# Patient Record
Sex: FEMALE | Race: WHITE
[De-identification: names, ages, dates, MRNs, and addresses within clinical notes are randomized per-mention and may not be internally consistent; named-entity substitution may affect disease eponyms.]

---

## 2017-01-28 ENCOUNTER — HOSPITAL ENCOUNTER (OUTPATIENT)
Dept: HOSPITAL 62 - ER | Age: 37
Discharge: HOME | End: 2017-01-28
Attending: OBSTETRICS & GYNECOLOGY
Payer: MEDICAID

## 2017-01-28 VITALS — DIASTOLIC BLOOD PRESSURE: 69 MMHG | SYSTOLIC BLOOD PRESSURE: 109 MMHG

## 2017-01-28 DIAGNOSIS — O09.522: ICD-10-CM

## 2017-01-28 DIAGNOSIS — O99.89: ICD-10-CM

## 2017-01-28 DIAGNOSIS — M54.9: ICD-10-CM

## 2017-01-28 DIAGNOSIS — O47.02: Primary | ICD-10-CM

## 2017-01-28 DIAGNOSIS — Z3A.24: ICD-10-CM

## 2017-01-28 LAB
APPEARANCE UR: (no result)
BARBITURATES UR QL SCN: NEGATIVE
BILIRUB UR QL STRIP: NEGATIVE
GLUCOSE UR STRIP-MCNC: NEGATIVE MG/DL
KETONES UR STRIP-MCNC: (no result) MG/DL
METHADONE UR QL SCN: NEGATIVE
NITRITE UR QL STRIP: POSITIVE
PCP UR QL SCN: NEGATIVE
PH UR STRIP: 5 [PH] (ref 5–9)
PROT UR STRIP-MCNC: 30 MG/DL
SP GR UR STRIP: 1.01
URINE OPIATES LOW: NEGATIVE
UROBILINOGEN UR-MCNC: NEGATIVE MG/DL (ref ?–2)

## 2017-01-28 PROCEDURE — 59899 UNLISTED PX MAT CARE&DLVR: CPT

## 2017-01-28 PROCEDURE — 4A1HXCZ MONITORING OF PRODUCTS OF CONCEPTION, CARDIAC RATE, EXTERNAL APPROACH: ICD-10-PCS | Performed by: OBSTETRICS & GYNECOLOGY

## 2017-01-28 PROCEDURE — 81001 URINALYSIS AUTO W/SCOPE: CPT

## 2017-01-28 PROCEDURE — 80307 DRUG TEST PRSMV CHEM ANLYZR: CPT

## 2017-01-28 NOTE — L&D FLOW SHEET
=================================================================

LD Flowsheet

=================================================================

Datetime Report Generated by CPN: 01/28/2017 14:00

   

   

=================================================================

Datetime: 01/28/2017 13:35

=================================================================

   

   

=================================================================

Vaginal Exam

=================================================================

   

 Dilatation (cm):  0.0 (Milvia Sorensen RN)

 Exam by:  Dr. Warner (Milvia Sorensen RN)

 Vaginal Exam Comments:  No tension noted on cerclage. (Milvia Sorensen, RN)

   

=================================================================

Communication

=================================================================

   

 Communication:  Provider at Bedside (Milvia Sorensen RN)

 Provider Notified (Name):   Neadali (Milvia Sorensen RN)

   

=================================================================

Datetime: 01/28/2017 13:24

=================================================================

   

   

=================================================================

Pain

=================================================================

   

 Pain Scale:  5 (Milvia Sorensen RN)

 Pain Presence:  Intermittent (Milvia Sorensen RN)

 Pain Type:  Cramping (Milvia Sorensen RN)

 Pain Location:  Abdomen; Back (Milvia Sorensen RN)

 Pain Goal:  0 (Milvia Sorensen RN)

 Pain Relief Measures:  Comfort Measures (Milvia Sorensen RN)

 Pain Coping:  Talking Through Contractions (Milvia Sorensen, RN)

 Vaginal Bleeding:  None (Milvia Sorensen, RN)

   

=================================================================

Maternal Assessment

=================================================================

   

 Level of Consciousness:  Fully Conscious (Milvia Sorensen, RN)

 DTR's/Clonus:  DTRs 2+; No Clonus (Milvia Sorensen, RN)

 Headache:  Occipital; Frontal (Milvia Sorensen, RN)

 Breath Sounds, Left:  Clear and Equal (Milvia Sorensen, RN)

 Breath Sounds, Right:  Clear and Equal (Milvia Sorensen, RN)

 Nausea/Vomiting:  Denies (Milvia Sorensen, RN)

 RUQ Epigastric Pain:  Denies (Imlvia Sorensen, RN)

   

=================================================================

Datetime: 01/28/2017 13:23

=================================================================

   

   

=================================================================

Fetal Assessment A

=================================================================

   

 Monitor Mode:  External US (Milvia Sorensen, RN)

 FHR Baseline Rate :  155 (Milvia Sorensen, RN)

   

=================================================================

Datetime: 01/28/2017 13:13

=================================================================

   

   

=================================================================

Uterine Activity

=================================================================

   

 Contraction Comments:  TOCO adjusted, referenced. (Milvia Sorensen, RN)

   

=================================================================

Datetime: 01/28/2017 13:07

=================================================================

   

 Temperature (F):  99.6 (Milvia Sorensen, RN)

 Temperature (C):  37.6 (QS system process)

   

=================================================================

Datetime: 01/28/2017 13:04

=================================================================

   

   

=================================================================

Vital Signs

=================================================================

   

 NBP Sys/Chiqui/Mean (mmHg):  119 (QS system process)

:  72 (QS system process)

:  91 (QS system process)

 Pulse:  112 (QS system process)

## 2017-01-29 ENCOUNTER — HOSPITAL ENCOUNTER (OUTPATIENT)
Dept: HOSPITAL 62 - LC | Age: 37
Discharge: HOME | End: 2017-01-29
Attending: GENERAL ACUTE CARE HOSPITAL
Payer: MEDICAID

## 2017-01-29 VITALS — DIASTOLIC BLOOD PRESSURE: 61 MMHG | SYSTOLIC BLOOD PRESSURE: 104 MMHG

## 2017-01-29 DIAGNOSIS — R10.9: ICD-10-CM

## 2017-01-29 DIAGNOSIS — Z3A.24: ICD-10-CM

## 2017-01-29 DIAGNOSIS — O23.42: Primary | ICD-10-CM

## 2017-01-29 DIAGNOSIS — O26.892: ICD-10-CM

## 2017-01-29 LAB
APPEARANCE UR: (no result)
BARBITURATES UR QL SCN: NEGATIVE
BILIRUB UR QL STRIP: NEGATIVE
GLUCOSE UR STRIP-MCNC: NEGATIVE MG/DL
KETONES UR STRIP-MCNC: NEGATIVE MG/DL
METHADONE UR QL SCN: NEGATIVE
NITRITE UR QL STRIP: POSITIVE
PCP UR QL SCN: NEGATIVE
PH UR STRIP: 6 [PH] (ref 5–9)
PROT UR STRIP-MCNC: NEGATIVE MG/DL
SP GR UR STRIP: 1.01
URINE OPIATES LOW: NEGATIVE
UROBILINOGEN UR-MCNC: 4 MG/DL (ref ?–2)

## 2017-01-29 PROCEDURE — 4A1HXCZ MONITORING OF PRODUCTS OF CONCEPTION, CARDIAC RATE, EXTERNAL APPROACH: ICD-10-PCS | Performed by: OBSTETRICS & GYNECOLOGY

## 2017-01-29 PROCEDURE — 87186 SC STD MICRODIL/AGAR DIL: CPT

## 2017-01-29 PROCEDURE — 59899 UNLISTED PX MAT CARE&DLVR: CPT

## 2017-01-29 PROCEDURE — 87086 URINE CULTURE/COLONY COUNT: CPT

## 2017-01-29 PROCEDURE — 81001 URINALYSIS AUTO W/SCOPE: CPT

## 2017-01-29 PROCEDURE — 87088 URINE BACTERIA CULTURE: CPT

## 2017-01-29 PROCEDURE — 76815 OB US LIMITED FETUS(S): CPT

## 2017-01-29 PROCEDURE — 80307 DRUG TEST PRSMV CHEM ANLYZR: CPT

## 2017-01-29 NOTE — L&D FLOW SHEET
=================================================================

LD Flowsheet

=================================================================

Datetime Report Generated by CPN: 01/29/2017 16:00

   

   

=================================================================

Datetime: 01/29/2017 15:46

=================================================================

   

 NBP Sys/Chiqui/Mean (mmHg):  108 (QS system process)

:  64 (QS system process)

:  81 (QS system process)

 Pulse:  103 (QS system process)

 LaborFlag:  Antepartum (QS system process)

## 2017-01-29 NOTE — L&D FLOW SHEET
=================================================================

LD Flowsheet

=================================================================

Datetime Report Generated by CPN: 01/29/2017 18:00

   

   

=================================================================

Datetime: 01/29/2017 17:00

=================================================================

   

 NBP Sys/Chiqui/Mean (mmHg):  110 (QS system process)

:  69 (QS system process)

:  83 (QS system process)

 Pulse:  100 (QS system process)

 LaborFlag:  Antepartum (QS system process)

   

=================================================================

Datetime: 01/29/2017 16:38

=================================================================

   

 Pain Scale:  5 (Milvia Sorensen RN)

 Pain Presence:  Intermittent (Milvia Sorensen RN)

 Pain Type:  Cramping (Milvia Sorensen RN)

 Pain Location:  Abdomen; Back (Milvia Sorensen RN)

 Pain Goal:  0 (Milvia Sorensen RN)

 Vaginal Bleeding:  None (Milvia Sorensen RN)

 Level of Consciousness:  Fully Conscious (Milvia Sorensen RN)

 DTR's/Clonus:  DTRs 2+; No Clonus (Milvia Sorensen RN)

 Headache:  Frontal (Milvia Sorensen RN)

 Breath Sounds, Left:  Clear and Equal (Milvia Sorensen RN)

 Breath Sounds, Right:  Clear and Equal (Milvia Sorensen RN)

 Nausea/Vomiting:  Denies (Milvia Sorensen RN)

 RUQ Epigastric Pain:  Denies (Milvia Sorensen RN)

 LaborFlag:  Antepartum (QS system process)

## 2017-01-29 NOTE — ER DOCUMENT REPORT
ED Medical Screen (RME)





- General


Stated Complaint: BACK PAIN, HEADACHE


TRAVEL OUTSIDE OF THE [place] IN LAST 30 DAYS: No





- Related Data


Allergies/Adverse Reactions: 


 





iodine Allergy (Verified 10/15/16 10:14)


 











Past Medical History





- Past Medical History


Cardiac Medical History: Reports: Hx DVT


GI Medical History: Reports: Hx Gastritis, Hx Gastroesophageal Reflux Disease, 

Hx Irritable Bowel


Musculoskeltal Medical History: Reports Hx Musculoskeletal Trauma


Psychiatric Medical History: Reports: Hx Anxiety, Hx Depression, Hx Post 

Traumatic Stress Disorder


Past Surgical History: Reports: Hx Appendectomy, Hx Oral Surgery





- Immunizations


Hx Diphtheria, Pertussis, Tetanus Vaccination: Yes

## 2017-01-31 ENCOUNTER — HOSPITAL ENCOUNTER (OUTPATIENT)
Dept: HOSPITAL 62 - LC | Age: 37
LOS: 1 days | Discharge: ANOTHER HOSPITAL | End: 2017-02-01
Attending: OBSTETRICS & GYNECOLOGY
Payer: MEDICAID

## 2017-01-31 DIAGNOSIS — O26.872: ICD-10-CM

## 2017-01-31 DIAGNOSIS — O42.912: Primary | ICD-10-CM

## 2017-01-31 DIAGNOSIS — O23.42: ICD-10-CM

## 2017-01-31 DIAGNOSIS — Z3A.25: ICD-10-CM

## 2017-01-31 DIAGNOSIS — O09.522: ICD-10-CM

## 2017-01-31 LAB
A1 MICROGLOB PLACENTAL VAG QL: POSITIVE
APPEARANCE UR: CLEAR
BILIRUB UR QL STRIP: NEGATIVE
GLUCOSE UR STRIP-MCNC: NEGATIVE MG/DL
KETONES UR STRIP-MCNC: NEGATIVE MG/DL
NITRITE UR QL STRIP: NEGATIVE
PH UR STRIP: 7 [PH] (ref 5–9)
PROT UR STRIP-MCNC: NEGATIVE MG/DL
SP GR UR STRIP: 1.01
UROBILINOGEN UR-MCNC: NEGATIVE MG/DL (ref ?–2)

## 2017-01-31 PROCEDURE — 84112 EVAL AMNIOTIC FLUID PROTEIN: CPT

## 2017-01-31 PROCEDURE — 59899 UNLISTED PX MAT CARE&DLVR: CPT

## 2017-01-31 PROCEDURE — 80307 DRUG TEST PRSMV CHEM ANLYZR: CPT

## 2017-01-31 PROCEDURE — 81001 URINALYSIS AUTO W/SCOPE: CPT

## 2017-01-31 PROCEDURE — 4A1HXCZ MONITORING OF PRODUCTS OF CONCEPTION, CARDIAC RATE, EXTERNAL APPROACH: ICD-10-PCS | Performed by: OBSTETRICS & GYNECOLOGY

## 2017-02-01 LAB
BARBITURATES UR QL SCN: NEGATIVE
METHADONE UR QL SCN: NEGATIVE
PCP UR QL SCN: NEGATIVE
URINE OPIATES LOW: NEGATIVE

## 2017-02-01 NOTE — L&D CURRENT ADMISSION
=================================================================

Current Admit

=================================================================

Datetime Report Generated by CPN: 02/01/2017 04:45

   

   

=================================================================

ADMISSION INFORMATION

=================================================================

   

Chief Complaint:  Suspected Rupture of Membranes    (01/31/2017

   23:46:Shayna Reyna RN)

Chief Complaint:  Back Pain; Headache    (01/29/2017 16:38:Milvia Sorensen RN)

Chief Complaint:  Uterine Cramping; Back Pain    (01/28/2017 13:24:Milvia Sorensen RN)

## 2017-02-01 NOTE — L&D ADMISSION ASSESSMENT
=================================================================

LD ADM ASMT

=================================================================

Datetime Report Generated by CPN: 02/01/2017 04:45

   

Assessment Type:  Triage    (01/31/2017 23:46:Shayna Reyna RN)

Weight (lb):  163    (02/01/2017 00:10:QS system process)

Weight (kg):  74.1    (02/01/2017 00:10:QS system process)

BMI:  26.3    (02/01/2017 00:10:QS system process)

Pain Scale:  3    (01/31/2017 23:46:Shayna Reyna RN)

Pain Presence:  Intermittent    (01/31/2017 23:46:Shayna Reyna RN)

Pain Type:  Cramping    (01/31/2017 23:46:Shayna Reyna RN)

Pain Location:  Abdomen    (01/31/2017 23:46:Shayna Reyna RN)

Pain Goal:  0    (01/31/2017 23:46:Shayna Reyna RN)

Pain Related to Contraction:  Unsure    (01/31/2017 23:46:Shayna Reyna RN)

Frequency (min):  Irritability    (02/01/2017 01:30:Shayna Reyna RN)

Frequency (min):  Irritability    (02/01/2017 01:15:Shayna Reyna RN)

Frequency (min):  Irritability    (02/01/2017 01:00:Shayna Reyna RN)

Resting Tone Christiana:  Relaxed    (02/01/2017 01:30:Shayna Reyna RN)

Resting Tone Christiana:  Relaxed    (02/01/2017 01:15:Shayna Reyna RN)

Resting Tone Christiana:  Relaxed    (02/01/2017 01:00:Shayna Reyna RN)

Resting Tone Christiana:  Relaxed    (02/01/2017 00:30:Shayna Reyna RN)

Resting Tone Christiana:  Relaxed    (02/01/2017 00:00:Shayna Reyan RN)

Contraction Comments:  No contractions noted on strip    (02/01/2017

   01:30:Shayna Reyna RN)

Contraction Comments:  No contractions noted on strip    (02/01/2017

   01:15:Shayna eRyna RN)

Contraction Comments:  No contractions noted on strip    (02/01/2017

   01:00:Shayna Reyna RN)

Contraction Comments:  No contractions noted on strip    (02/01/2017

   00:30:Shayna Reyna RN)

Contraction Comments:  No contractions noted on strip; patient states

   she is cramping    (02/01/2017 00:00:Shayna Reyna RN)

Level of Consciousness:  Fully Conscious    (01/31/2017 23:46:Shayna Reyna RN)

DTR's/Clonus:  DTRs 1+; No Clonus    (01/31/2017 23:46:Shayna Reyna RN)

Headache:  Generalized    (01/31/2017 23:46:Shayna Reyna RN)

Dizziness:  No    (01/31/2017 23:46:Shayna Reyna RN)

Blurred Vision:  No    (01/31/2017 23:46:Shayna Reyna RN)

Extremity Numbness/Tingling :  None    (01/31/2017 23:46:Shayna Reyna RN)

Extremity Movement:  Full Range of Motion    (01/31/2017 23:46:Shayna Reyna RN)

Heart Rhythm:  Regular    (01/31/2017 23:46:Shayna Reyna RN)

Nailbeds:  Pink    (01/31/2017 23:46:Shayna Reyna RN)

Capillary Refill:  Less than 3 Seconds    (01/31/2017 23:46:Shayna Reyna RN)

Lower Extremities Edema:  None    (01/31/2017 23:46:Shayna Reyna RN)

Lower Extremities Edema Degree:  None    (01/31/2017 23:46:Shayna Reyna RN)

Upper Extremities Edema:  None    (01/31/2017 23:46:Shayna Reyna RN)

Upper Extremities Edema Degree:  None    (01/31/2017 23:46:Shayna Reyna RN)

Facial Edema:  None    (01/31/2017 23:46:Shayna Reyna RN)

Rita's Sign Left Leg:  Negative    (01/31/2017 23:46:Shayna Reyna RN)

Rita's Sign Right Leg:  Negative    (01/31/2017 23:46:Shayna Reyna RN)

DVT Risk Age:  Age less than 41 years    (01/31/2017 23:46:Shayna Reyna RN)

DVT Risk BMI:  BMI<31    (01/31/2017 23:46:Shayna Reyna RN)

DVT Risk Surgery:  History of Prior Major Surgery    (01/31/2017

   23:46:Shayna Reyna RN)

DVT Risk Other:  Women Only- Pregnancy or Postpartum (<1 month)   

   (01/31/2017 23:46:Shayna Reyna RN)

DVT Risk Total:  2    (01/31/2017 23:46:QS system process)

DVT Risk Text:  Moderate Risk (10-20%) - Consider stockings,

   compresssion device, pharmacological therapy per hospital policy   

   (01/31/2017 23:46:QS system process)

Respiratory Effort:  Unlabored; Regular Rhythm; Equal Expansion   

   (01/31/2017 23:46:Shayna Reyna RN)

Breath Sounds, Left:  Clear and Equal    (01/31/2017 23:46:Shayna Reyna RN)

Breath Sounds, Right:  Clear and Equal    (01/31/2017 23:46:Shayna Reyna RN)

Cough Productivity:  None    (01/31/2017 23:46:Shayna Reyna RN)

Nausea/Vomiting:  Present    (01/31/2017 23:46:Shayna Reyna RN)

Bowel Sounds:  Normoactive    (01/31/2017 23:46:Shayna Reyna RN)

RUQ Epigastric Pain:  Denies    (01/31/2017 23:46:Shayna Reyna RN)

Bowel Patterns:  Soft, Formed Stool    (01/31/2017 23:46:Shayna Reyna RN)

Hemorrhoids:  None    (01/31/2017 23:46:Shayna Reyna RN)

Diet Type:  Regular diet    (01/31/2017 23:46:Shayna Reyna RN)

Last Meal:  01/31/2017 19:30    (01/31/2017 23:46:Shayna Reyna RN)

Bladder:  Nondistended    (01/31/2017 23:46:Shayna Reyna RN)

Frequency of Urination:  No    (01/31/2017 23:46:Shayna Reyna RN)

Urination Burning:  No    (01/31/2017 23:46:Shayna Reyna RN)

CVA Tenderness:  No    (01/31/2017 23:46:Shayna Reyna RN)

Skin Color:  Normal for Race    (01/31/2017 23:46:Shayna Reyna RN)

Skin Temperature:  Warm    (01/31/2017 23:46:Shayna Reyna RN)

Skin Moisture:  Dry    (01/31/2017 23:46:Shayna Reyna RN)

Ranjan Scale Sensory Perception:  No Impairment- Responds to verbal

   commands.  Has no sensory deficit which would limit ability to feel

   or voice pain or discomfort    (01/31/2017 23:46:Shayna Reyna RN)

Ranjan Scale Moisture:  Rarely Moist- Skin is usually dry.  Linen only

   requires changing at routine intervals    (01/31/2017 23:46:Shayna Reyna RN)

Ranjan Scale Activity:  Walks Frequently- Walks outside the room at

   least twice a day and inside room at least every 2 hours during the

   day.    (01/31/2017 23:46:Shayna Reyna RN)

Ranjan Scale Mobility:  No Limitations- Makes major and frequent

   changes in position without assistance    (01/31/2017 23:46:Shayna Reyna RN)

Ranjan Scale Nutrition:  Excellent- Eats most of every meal.  Never

   refuses a meal.  Usually eats a total of 4 or more servings of meat

   and dairy products.  Occasionally eats between meals.  Does not

   require supplementation    (01/31/2017 23:46:Shayna Reyna RN)

Ranjan Scale Friction and Shear:  No Apparent Problem- Moves in bed and

   in chair independently and has sufficient muscle strength to lift up

   completely during move.  Maintains good position in bed or chair at

   all times    (01/31/2017 23:46:Shayna Reyna RN)

Ranjan Scale Total:  23    (01/31/2017 23:46:QS system process)

Ranjan Scale Risk:  No Risk of Pressure Ulcer Noted at this Time   

   (01/31/2017 23:46:QS system process)

Family Support:  Family supportive    (01/31/2017 23:46:Shayna Reyna RN)

Emotional State:  Calm/Relaxed    (01/31/2017 23:46:Shayna Reyna RN)

Call Santiago Within Reach:  Yes    (01/31/2017 23:46:Shayna Reyna RN)

Side Rails Up:  Yes    (01/31/2017 23:46:Shayna Reyna RN)

Bed Wheels Locked:  Yes    (01/31/2017 23:46:Shayna Reyna RN)

Arm Bands Present:  Yes    (01/31/2017 23:46:Shayna Reyna RN)

Isolation:  Universal    (01/31/2017 23:46:Shayna Reyna RN)

Fall Risk History of Falling:  (0) No    (01/31/2017 23:46:Shayna Reyna RN)

Fall Risk Secondary Diagnosis:  (0) No    (01/31/2017 23:46:Shayna Reyna RN)

Fall Risk Ambulatory Aid:  (0) None/Bedrest/Wheelchair/Nurse Assist   

   (01/31/2017 23:46:Shayna Reyna RN)

Fall Risk IV Therapy:  (0) No    (01/31/2017 23:46:Shayna Reyna RN)

Fall Risk Gait:  (0) Normal/Bedrest/Immobile    (01/31/2017

   23:46:Shayna Reyna RN)

Fall Risk Mental Status:  (0) Oriented to Own Ability    (01/31/2017

   23:46:Shayna Reyna RN)

Fall Risk Score:  0    (01/31/2017 23:46:QS system process)

Fall Risk Score Definition:  No Risk: No action required    (01/31/2017

   23:46:QS system process)

Recent Exp Communicable Disease:  No    (01/31/2017 23:46:Shayna Reyna RN)

Cough or Fever:  No    (01/31/2017 23:46:Shayna Reyna RN)

Foreign Travel Past 10 Days:  No    (01/31/2017 23:46:Shayna Reyna RN)

Open Wounds or Sores:  No    (01/31/2017 23:46:Shayna Reyna RN)

Prior Antibiotic Resistance Tx:  No    (01/31/2017 23:46:Shayna Reyna RN)

Cultures Obtained:  Not Applicable    (01/31/2017 23:46:Shayna Reyna RN)

Isolation Initiated:  No    (01/31/2017 23:46:Shayna Reyna RN)

Pt/Family Education:  Handwashing Hygiene    (01/31/2017 23:46:Shayna Reyna RN)

FHR Baseline Rate (bpm) Baby A:  130    (02/01/2017 00:00:Shayna Reyna RN)

Variability Baby A:  Moderate 6-25 bpm    (02/01/2017 00:00:Shayna Reyna RN)

## 2017-02-01 NOTE — PDOC TRANSFER SUMMARY
General


Admission Date/PCP: 


  





  CLARA MOORE DO





Admission Date: 17


Transfer Date: 17


Accepting Facility: [facility]


Accepting Physician: Dr Nassar


Resuscitation Status: Full Code





- Transfer Diagnosis


(1) Pregnancy complicated by cervical shortening in second trimester


Is this a current diagnosis for this admission?: Yes





(2)  premature rupture of membranes, antepartum


Is this a current diagnosis for this admission?: Yes








- Transfer Medications


Home Medications: 


 





Cephalexin Monohydrate [Keflex 500 mg Capsule] 500 mg PO TID 17 








Transfer Medications: 


 Current Medications





Ampicillin Sodium (Omnipen Inj 2 Gm Vial)  2 gm IV IVBAG (ED) ONE


   Stop: 17 00:13


Azithromycin (Zithromax Inj 500 Mg Vial)  500 mg IV IVBAG (ED) ONE


   Stop: 17 00:13


Betamethasone Acet/Betameth SodPhos (Celestone Inj 6 Mg/1 Ml)  6 mg IM ONCE PRN


   Stop: 17 00:11











- Allergies


Allergies/Adverse Reactions: 


 





iodine Allergy (Verified 10/15/16 10:14)


 











- Diet/Activity


Discharge Diet: As Tolerated, Regular


Discharge Activity: Bedrest, Pelvic Rest





Hospital Course


Hospital Course: 


35yo  EDC 2017 EGA 25+0 presents c/o leakage of fluid since 2230 .  Pt reports only occasional cramping and denies and vaginal bleeding. 

Patient denies trauma, vaginal intercourse or drug use. She has had 3 prior 

term deliveries without complication. This pregnancy she was noted to have 

asymptomatic shortening cervix and M placed a cerclage around 1/3/17. Patient 

reported no complications until this week when noted to have uti and started on 

keflex yesterday. Pt reports no other medications (stopped zemerona and ASA). 

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 

months. She did develop a DVT from this but has not been on any anticoagulation.





Physical Exam


Vital Signs: 


 Intake & Output











 17





 06:59 06:59 06:59


 


Weight   74 kg











General appearance: PRESENT: no acute distress, cooperative


Respiratory exam: PRESENT: clear to auscultation lucian


Cardiovascular exam: PRESENT: RRR


GI/Abdominal exam: PRESENT: normal bowel sounds, soft.  ABSENT: distended, 

guarding, mass, organolmegaly, rebound, tenderness


Gentrourinary exam: PRESENT: other - fht 150 age appropriate, toco no ctx 

noted. Uterus is nontender Sterile Spec exam +pooling (also + Amnisure). 

cerclage in place. cervix appears several cm LONG and closed. no bleeding is 

noted


Extremities exam: PRESENT: full ROM.  ABSENT: calf tenderness, clubbing, pedal 

edema


Psychiatric exam: PRESENT: appropriate affect, normal mood.  ABSENT: homicidal 

ideation, suicidal ideation





Results


Laboratory Results: 


 











  17





  23:29


 


Urine Color  STRAW


 


Urine Appearance  CLEAR


 


Urine pH  7.0


 


Ur Specific Gravity  1.006


 


Urine Protein  NEGATIVE


 


Urine Glucose (UA)  NEGATIVE


 


Urine Ketones  NEGATIVE


 


Urine Blood  SMALL H


 


Urine Nitrite  NEGATIVE


 


Ur Leukocyte Esterase  NEGATIVE


 


Urine WBC (Auto)  3


 


Urine RBC (Auto)  1














Plan


Discharge Plan: 


Transfer to [facility] for teriary care and NICU. Betamethasone given. Ampicillin 

and zithromax IV given. Magnesium Sulfate for neuroprotection and aid with 

transfer.

## 2017-02-01 NOTE — L&D GENERAL ADMISSION
=================================================================

General Admit

=================================================================

Datetime Report Generated by CPN: 2017 04:45

   

   

=================================================================

PREGNANCY INFORMATION

=================================================================

   

Patient Age:  36    (2017 12:50:QS system process)

EDC:  2017 00:00    (2017 13:07:Yanet Albert RN)

:  6    (2017 13:07:Milvia Sorensen RN)

Para:  3    (2017 02:01:Shayna Reyna RN)

Para:  3    (2017 18:42:Yanet Albert RN)

Para:  3    (2017 13:07:Milvia Sorensen RN)

Term:  3    (2017 13:07:Milvia Sorensen RN)

:  0    (2017 13:07:Milvia Sorensen RN)

Spontaneous Abortions:  2    (2017 13:07:Milvia Sorensen RN)

Induced Abortions:  0    (2017 13:07:Milvia Sorensen RN)

Living:  3    (2017 13:07:Milvia Sorensen RN)

Cesareans:  0    (2017 13:07:Milvia Sorensen RN)

VBACs:  0    (2017 13:07:Milvia Sorensen RN)

Ectopic:  0    (2017 13:07:Milvia Sorensen RN)

Multiple Births:  0    (2017 13:07:Milvia Sorensen RN)

Baby, Number in Womb:  1    (2017 02:01:Shayna Reyna RN)

Baby, Number in Womb:  1    (2017 18:42:Yanet Albert RN)

Baby, Number in Womb:  1    (2017 13:07:Milvia Sorensen RN)

   

=================================================================

PRENATAL CARE

=================================================================

   

Primary Obstetrician:  [facility]    (2017

   13:07:Milvia Sorensen RN)

Month of 1st Prenatal Visit:  2017    (2017 13:07:Milvia Sorensen RN)

Adequate Prenatal Care:  Yes    (2017 13:07:Milvia Sorensen RN)

Height (in):  66    (2017 00:10:QS system process)

Height (in):  66    (2017 18:12:QS system process)

Height (in):  66    (2017 18:11:QS system process)

Height (in):  66    (2017 15:22:QS system process)

Height (in):  66    (2017 13:01:QS system process)

Height (in):  66    (2017 12:59:QS system process)

Height (in):  66    (2017 12:57:QS system process)

Height (in):  66    (2017 12:55:QS system process)

Height (in):  66    (2017 12:52:QS system process)

Height (in):  66    (2017 12:50:QS system process)

   

=================================================================

ALLERGIES

=================================================================

   

Medication Allergy:  No    (2017 13:07:Milvia Sorensen RN)

Medication Allergies:  iodine (10/15/2016)    (2017 12:50:QS

   system process)

Latex Allergy:  No Latex Allergies    (2017 13:07:Milvia Sorensen RN)

Food Allergies:  seafood    (2017 13:07:Milvia Sorensen RN)

Environmental Allergies:  None    (2017 13:07:Milvia Sorensen RN)

   

=================================================================

COMMUNICATION

=================================================================

   

Primary Language:  English    (2017 13:07:Milvia Sorensen RN)

Medical Tx Preferred Language:  English    (2017 13:07:Milvia Sorensen RN)

Communication Barrier(s):  None    (2017 13:07:Milvia Sorensen RN)

   

=================================================================

DEMOGRAPHICS

=================================================================

   

Address:  [address] [place] [address]   [id number]    (2017 12:50:QS system process)

Zipcode:  [address]    (2017 12:50:QS system process)

Home Telephone:  [phone]    (2017 12:50:QS system process)

SSN:      (2017 12:50:QS system process)

Next of Kin Name:  MEGGAN HOOPER    (2017 12:50:QS system

   process)

Next of Kin Phone:  [phone]    (2017 12:50:QS system

   process)

Next of Kin Relationship:  CH    (2017 12:50:QS system process)

YOB: 1980    (2017 12:50:QS system process)

Marital Status:      (2017 12:50:QS system process)

Sex:  Female    (2017 12:50:QS system process)

Race:      (2017 12:50:QS system process)

Ethnicity:  Non- or     (2017 12:50:QS system

   process)

Rastafarian:  Amish    (2017 12:50:QS system process)

   

=================================================================

DRUG AND ALCOHOL USE

=================================================================

   

Alcohol:  No    (2017 13:07:Milvia Sorensen RN)

Cigarettes:  Never Smoker. [id number]    (2017 13:07:Milvia Sorensen RN)

Marijuana:  No    (2017 13:07:Milvia Sorensen RN)

Cocaine:  No    (2017 13:07:Milvia Sorensen RN)

Other Illicit Drugs:  No    (2017 13:07:Milvia Sorensen RN)

   

=================================================================

VACCINE HISTORY

=================================================================

   

Influenza Vaccine:  No    (2017 13:07:Milvia Sorensen RN)

Pneumococcal Vaccine:  No    (2017 13:07:Milvia Sorensen RN)

Tetanus Vaccine:  No    (2017 13:07:Milvia Sorensen RN)

Tdap Vaccine:  No    (2017 13:07:Milvia Sorensen RN)

Hepatitis B Vaccine:  No    (2017 13:07:Milvia Sorensen RN)

   

=================================================================



=================================================================

   

Circumcision:  No    (2017 13:07:Milvia Sorensen RN)

Prenatal Classes Attended:  No    (2017 13:07:Milvia Sorensen RN)

Tubal Ligation:  Yes    (2017 13:07:Milvia Sorensen RN)

Tubal Authorization Signed:  N/A    (2017 13:07:Milvia Sorensen RN)

 Consent:  N/A    (2017 13:07:Milvia Sorensen RN)

 Consent Signed:  N/A    (2017 13:07:Milvia Sorensen RN)

Pain Management Plans:  Medications    (2017 13:07:Milvia Sorensen RN)

Plans for Labor and Delivery:  None    (2017 13:07:Milvia Sorensen RN)

Support Person:  Meggan Hooper    (2017 13:07:Milvia Sorensen RN)

Support Person Relationship:  Other    (2017 13:07:Milvia Sorensen RN)

Other Relationship:  daughter    (2017 13:07:Milvia Sorensen RN)

Cultural/Spritual Practice:  No    (2017 13:07:Milvia Sorensen RN)

Spir/Cult Dietary Needs:  No    (2017 13:07:Milvia Sorensen RN)

   

=================================================================

LIVING SITUATION/DISCHARGE PLAN

=================================================================

   

Living Arrangements:  House    (2017 13:07:Milvia Sorensen RN)

Adequate Access to::  Electric; Heat; Refrigeration; Plumbing/Running

   water; Phone; Transportation    (2017 13:07:Milvia Sorensen RN)

WIC Program:  Yes    (2017 13:07:Milvia Sorensen RN)

Discharge  Person:  Meggan Hooper    (2017 13:07:Milvia Sorensen RN)

Person to Help after Discharge:  Meggan Hoopre    (2017

   13:07:Milvia Sorensen RN)

Currently Using Commun Resources:  Yes    (2017 13:07:Mivlia Sorensen RN)

Specify Current Resource Used:  Medicaid    (2017 13:07:Milvia Sorensen RN)

Outside Agency/:  Yes    (2017 13:07:Milvia Sorensen RN)

Specify Agency/ :  unsure    (2017 13:07:Milvia Sorensen RN)

Adoption Requested:  No    (2017 13:07:Milvia Sorensen RN)

Pt Contact w/infant Post Birth:  N/A    (2017 13:07:Milvia Sorensen RN)

   

=================================================================

OB/PREVIOUS PREGNANCY HISTORY

=================================================================

   

Previous Pregnancy Procedures:  Ultrasound; NST    (2017

   13:07:Milvia Sorensen RN)

Current Pregnancy Procedures:  Ultrasound; NST; Cerclage    (2017

   13:07:Milvia Sorensen RN)

History of Previous :  No    (2017 13:07:Milvia Sorensen RN)

History of Gestational Diabetes:  No    (2017 13:07:Milvia Sorensen RN)

History of PIH:  No    (2017 13:07:Milvia Sorensen RN)

History of Incompetent Cervix:  No    (2017 13:07:Milvia Sorensen RN)

History of Placenta Previa/Abrup:  No    (2017 13:07:Milvia Sorensen RN)

History of Macrosomia:  No    (2017 13:07:Milvia Sorensen RN)

History of IUGR:  No    (2017 13:07:Milvia Sorensen RN)

History of Postpartum Hemorrhage:  No    (2017 13:07:Milvia Sorensen RN)

History of Loss/Stillborn:  No    (2017 13:07:Milvia Sorensen RN)

History of  Death:  No    (2017 13:07:Milvia Sorensen RN)

History of D (Rh) Sensitization:  No    (2017 13:07:Milvia Sorensen RN)

History Recurrent Loss/Stillborn:  No    (2017 13:07:Milvia Sorensen RN)

History Depression/PP Depression:  Yes    (2017 13:07:Milvia Sorensen RN)

History of  Uterine Anomaly/JAIMEE:  No    (2017 13:07:Milvia Sorensen RN)

History of Infertility:  No    (2017 13:07:Milvia Sorensen RN)

History of  ART Treatment:  No    (2017 13:07:Milvia Sorensen RN)

History of JAIMEE:  No    (2017 13:07:Milvia Sorensen RN)

Comments Obstetrical History:  G1 -  41 wks ()

   G2 -  41 wks ()

   G3 -  41 wks ()

   G4 - SAB at 5 wks ()

   G5 - SAB at 9 wks ()

   G6 -  current pregnancy (cerclage for short cervix), AMA   

   (2017 13:07:Milvia Sorensen RN)

   

=================================================================

MEDICAL HISTORY

=================================================================

   

Med Hx Diabetes:  No    (2017 13:07:Milvia Sorensen RN)

Med Hx Hypertension:  No    (2017 13:07:Milvia Sorensen RN)

Med Hx Heart Disease:  No    (2017 13:07:Milvia Sorensen RN)

Med Hx Autoimmune Disorder:  Yes    (2017 13:07:Milvia Sorensen RN)

Med Hx Kidney Disease/UTI:  No    (2017 13:07:Milvia Sorensen RN)

Med Hx Neurologic/Epilepsy:  No    (2017 13:07:Milvia Sorensen RN)

Med Hx Psychiatric Disorders:  Yes    (2017 13:07:Milvia Sorensen RN)

Med Hx Hepatitis/Liver Disease:  No    (2017 13:07:Milvia Sorensen RN)

Med Hx Varicosities/Phlebitis:  No    (2017 13:07:Milvia Sorensen RN)

Med Hx Thyroid Dysfunction:  No    (2017 13:07:Milvia Sorensen RN)

Med Hx Trauma/Violence:  No    (2017 13:07:Milvia Sorensen RN)

Med Hx Blood Transfusion:  No    (2017 13:07:Milvia Sorensen RN)

Med Hx Pulmonary (Asthma,TB):  No    (2017 13:07:Milvia Sorensen RN)

Med Hx Breast:  No    (2017 13:07:Milvia Sorensen RN)

Med Hx GYN Surgery:  No    (2017 13:07:Milvia Sorensen RN)

Med Hx Hospitalization/Surgery:  Yes    (2017 13:07:Milvia Sorensen RN)

Med Hx Anesthetic Complications:  No    (2017 13:07:Milvia Sorensen RN)

Med Hx Abnormal Pap Smear:  No    (2017 13:07:Milvia Sorensen RN)

Other Medical Diseases:  No    (2017 13:07:Milvia Sorensen RN)

Med Hx Significant Family Hx:  No    (2017 13:07:Milvia Sorensen RN)

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

   hx DVT,  x 3, appendectomy 2005    (2017 13:07:Milvia Sorensen RN)

   

=================================================================

INFECTIOUS HISTORY

=================================================================

   

Inf Hx Gonorrhea:  No    (2017 13:07:Milvia Sorensen RN)

Inf Hx Chlamydia:  No    (2017 13:07:Milvia Sorensen RN)

Inf Hx Syphilis:  No    (2017 13:07:Milvia Sorensen RN)

Inf Hx HIV/AIDS:  No    (2017 13:07:Milvia Sorensen RN)

Inf Hx Human Papilloma Virus:  No    (2017 13:07:Milvia Sorensen RN)

Inf Hx Pt/Partner Genital Herpes:  No    (2017 13:07:Milvia Soernsen RN)

Inf Hx Tuberculosis/Exposure:  No    (2017 13:07:Milvia Sorensen RN)

Inf Hx Hepatitis B,C:  No    (2017 13:07:Milvia Sorensen RN)

Inf Hx Rash or Viral Illness:  No    (2017 13:07:Milvia Sorensen RN)

   

=================================================================

GENETIC HISTORY

=================================================================

   

Gen Hx Age >=35 at [place]:  No    (2017 13:07:Milvia Sorensen RN)

Gen Hx Thalassemia:  No    (2017 13:07:Milvia Sorensen RN)

Gen Hx Congenital Heart Defect:  No    (2017 13:07:Milvia Sorensen RN)

Gen Hx Neural Tube Defect:  No    (2017 13:07:Milvia Sorensen RN)

Gen Hx Down's Syndrome:  No    (2017 13:07:Milvia Sorensen RN)

Gen Hx Pedro Pablo-Sachs:  No    (2017 13:07:Milvia Sorensen RN)

Gen Hx [place]:  No    (2017 13:07:Milvia Sorensen RN)

Gen Hx Familial Dysautonomia:  No    (2017 13:07:Milvia Sorensen RN)

Gen Hx Sickle Cell Disease/Trait:  No    (2017 13:07:Milvia Sorensen RN)

Gen Hx Hemophilia/Blood Disorder:  No    (2017 13:07:Milvia Sorensen RN)

Gen Hx Muscular Dystrophy:  No    (2017 13:07:Milvia Sorensen RN)

Gen Hx Cystic Fibrosis:  No    (2017 13:07:Milvia Sorensen RN)

Gen Hx Huntingtons Chorea:  No    (2017 13:07:Milvia Sorensen RN)

Gen Hx Mental Retardation/Autism:  No    (2017 13:07:Milvia Sorensen RN)

Gen Hx Tested for Fragile X:  No    (2017 13:07:Milvia Sorensen RN)

Gen Hx Other Inher/Chromosomal:  No    (2017 13:07:Milvia Sorensen RN)

Gen Hx Maternal Metabolic DO:  No    (2017 13:07:Milvia Sorensen RN)

Gen Hx Pt Father or FOB Defect:  No    (2017 13:07:Milvia Sorensen RN)

Gen Hx Other Genetic History:  No    (2017 13:07:Milvia Sorensen RN)

Gen Hx Drugs/Meds since LMP:  No    (2017 13:07:Milvia Sorensen RN)

## 2017-02-01 NOTE — L&D FLOW SHEET
=================================================================

LD Flowsheet

=================================================================

Datetime Report Generated by CPN: 02/01/2017 04:45

   

   

=================================================================

Datetime: 02/01/2017 01:52

=================================================================

   

 Communication Comments:  [facility] service left with patient

   for [facility] (Shayna Field, RN)

   

=================================================================

Datetime: 02/01/2017 01:32

=================================================================

   

 NBP Sys/Chiqui/Mean (mmHg):  113 (QS system process)

:  68 (QS system process)

:  85 (QS system process)

 Pulse:  93 (QS system process)

 LaborFlag:  Antepartum (QS system process)

   

=================================================================

Datetime: 02/01/2017 01:30

=================================================================

   

 Monitor Mode:  External; Palpation (Shayna Field, RN)

 Frequency (min):  Irritability (Shayna Field, RN)

 Resting Tone (Palpate):  Relaxed (Shayna Field, RN)

 Contraction Comments:  No contractions noted on strip (Shayna Field,

   RN)

   

=================================================================

Datetime: 02/01/2017 01:21

=================================================================

   

 Communication:  Report Given to @ A.Doff, RN at [facility] (Shayna Field,

   RN)

   

=================================================================

Datetime: 02/01/2017 01:20

=================================================================

   

 NBP Sys/Chiqui/Mean (mmHg):  108 (QS system process)

:  62 (QS system process)

:  80 (QS system process)

 Pulse:  93 (QS system process)

 LaborFlag:  Antepartum (QS system process)

   

=================================================================

Datetime: 02/01/2017 01:16

=================================================================

   

 Medication Comments:  2 gm started (Shayna Field, RN)

   

=================================================================

Datetime: 02/01/2017 01:15

=================================================================

   

 Monitor Mode:  External; Palpation (Shayna Field, RN)

 Frequency (min):  Irritability (Shayna Field, RN)

 Resting Tone (Palpate):  Relaxed (Shayna Field, RN)

 Contraction Comments:  No contractions noted on strip (Shayna Field,

   RN)

   

=================================================================

Datetime: 02/01/2017 01:10

=================================================================

   

 Communication Comments:  Pt experiencing pruritis. Call placed to Dr Lomeli and made aware of same. Orders received from Dr Lomeli for

   Benadryl 50mg IVP. Orders carried out, primary RN J Serna made

   aware, medication obtained and given to primary RN. (Kimberly Montano, RN)

   

=================================================================

Datetime: 02/01/2017 01:07

=================================================================

   

 Patient Care Comments:  Consents for transfer signed. (Yeni Miki, RN)

   

=================================================================

Datetime: 02/01/2017 01:00

=================================================================

   

 Monitor Mode:  External; Palpation (Shayna Reyna RN)

 Frequency (min):  Irritability (Shayna Reyna, RN)

 Resting Tone (Palpate):  Relaxed (Shayna Reyna, RN)

 Contraction Comments:  No contractions noted on strip (Shayna Reyna,

   RN)

   

=================================================================

Datetime: 02/01/2017 00:57

=================================================================

   

 Magnesium/Antihypertensives:  Magnesium Sulfate IV Loading (Gm) @ 4 gm

   (Yeni Miki, RN)

   

=================================================================

Datetime: 02/01/2017 00:54

=================================================================

   

 Communication:  Report Given to @ Lin, RN (Shayna Field, RN)

 Communication Comments:  Report given to [facility] transport center

   (Shayna Field, RN)

   

=================================================================

Datetime: 02/01/2017 00:52

=================================================================

   

 Antibiotics:  Zithromax (Yeni Miki, RN)

 Medication Comments:  500 mg IV hung. (Yeni Miki, RN)

   

=================================================================

Datetime: 02/01/2017 00:50

=================================================================

   

 Antibiotics:  Zithromax (Shayna Field, RN)

 Medication Comments:  500 mg (Shayna Field, RN)

   

=================================================================

Datetime: 02/01/2017 00:46

=================================================================

   

 Antiemetics/Antacids:  Zofran IV (mg) @ 4 (Shayna Field, RN)

   

=================================================================

Datetime: 02/01/2017 00:44

=================================================================

   

 Antibiotics:  Ampicillin IV 2 Gm (Shayna Field, RN)

   

=================================================================

Datetime: 02/01/2017 00:35

=================================================================

   

 Communication Comments:  Pt experiencing some nausea after IV

   insertion. Dr Lomeli on unit and made aware of same. Orders received

   for 4mg Zofran IVP. Orders carried out, primary RN J Serna made

   aware of same. (Kimberly Montano RN)

   

=================================================================

Datetime: 02/01/2017 00:31

=================================================================

   

 Steroids:  Celestone 12mg IM - Dose 1 (Shayna Reyna, MORELIA)

   

=================================================================

Datetime: 02/01/2017 00:30

=================================================================

   

 Monitor Mode:  External; Palpation (Shayna Reyna RN)

 Resting Tone (Palpate):  Relaxed (Shayna Reyna RN)

 Contraction Comments:  No contractions noted on strip (Shayna Reyna RN)

 IV/Blood Work:  IV Started; IV Bolus Started; New IV Bag Hung

   (Kimberly Montano RN)

   

=================================================================

Datetime: 02/01/2017 00:17

=================================================================

   

 Communication Comments:  DrCam Lomeli at bedside (Shayna Reyna, RN)

   

=================================================================

Datetime: 02/01/2017 00:10

=================================================================

   

 NBP Sys/Chiqui/Mean (mmHg):  107 (QS system process)

:  66 (QS system process)

:  81 (QS system process)

 Pulse:  88 (QS system process)

 LaborFlag:  Antepartum (QS system process)

   

=================================================================

Datetime: 02/01/2017 00:08

=================================================================

   

 Communication:  RN Reviewed Strip; Provider Orders Received (Shayna Reyna RN)

 Communication Comments:  Orders received from Dr. Lomeli for Amp 2 gms;

   dose of betamethazone; dose of zithromax 500 mg IV x1 (Shayna Reyna, RN)

   

=================================================================

Datetime: 02/01/2017 00:00

=================================================================

   

 Monitor Mode:  External; Palpation (Shayna Reyna RN)

 Resting Tone (Palpate):  Relaxed (Shayna Reyna RN)

 Contraction Comments:  No contractions noted on strip; patient states

   she is cramping (Shayna Reyna RN)

 Monitor Mode:  External US (Shayna Reyna RN)

 FHR Baseline Rate :  130 (Shayna Reyna RN)

 Variability:  Moderate 6-25 bpm (Shayna Reyna RN)

 Comments:  Appropriate for gestational age (Shayna Reyna RN)

   

=================================================================

Datetime: 01/31/2017 23:46

=================================================================

   

 Pain Scale:  3 (Shayna Reyna RN)

 Pain Presence:  Intermittent (Shayna Reyna RN)

 Pain Type:  Cramping (Shayna Reyna RN)

 Pain Location:  Abdomen (Shayna Reyna, RN)

 Pain Goal:  0 (Shayna Reyna RN)

 Pain Relief Measures:  Comfort Measures (Shayna Reyna RN)

 Pain Coping:  Talking Through Contractions; Breathing Through

   Contractions (Shayna Reyna RN)

 Vaginal Bleeding:  None (Shayna Reyna RN)

 Level of Consciousness:  Fully Conscious (Shayna Reyna RN)

 DTR's/Clonus:  DTRs 1+; No Clonus (Shayna Reyna RN)

 Headache:  Generalized (Shayna Reyna RN)

 Breath Sounds, Left:  Clear and Equal (Shayna Reyna RN)

 Breath Sounds, Right:  Clear and Equal (Shayna Reyna RN)

 Nausea/Vomiting:  Present (Shayna Reyna RN)

 RUQ Epigastric Pain:  Denies (Shayna Reyna RN)

 Instructional Method:  Verbal; Patient Instructed; Verbalized

   Understanding (Shayna Reyna RN)

 Plan of Care:  Plan of Care Discussed (Shayna Reyna RN)

 Unit Routine:  [place] to Room; Call [place]; Bed; Visiting Policy;

   Waiting Areas; Phone/Cell Phone Use; Unit Personnel; Handwashing;

   Flu/Illness Precautions; Fetal Monitoring; Safety/Fall Risk

   Prevention; Bathroom Privileges (Shayna Reyna RN)

 LaborFlag:  Antepartum (QS system process)

   

=================================================================

Datetime: 01/31/2017 23:40

=================================================================

   

 NBP Sys/Chiqui/Mean (mmHg):  131 (QS system process)

:  61 (QS system process)

:  84 (QS system process)

 Pulse:  80 (QS system process)

 LaborFlag:  Antepartum (QS system process)

   

=================================================================

Datetime: 01/31/2017 23:38

=================================================================

   

Stage of Pregnancy:  Antepartum (Yeni Livingston RN)

## 2017-02-01 NOTE — ANTEPARTUM DISCHARGE SUMMARY
=================================================================

Antepartum DC

=================================================================

Datetime Report Generated by CPN: 02/01/2017 04:45

   

Diet:  Regular    (02/01/2017 02:01:Shayna Reyna RN)

Diet:  Regular    (01/29/2017 18:42:Yanet Albert RN)

Activity:  Normal Activity    (01/29/2017 18:42:Yanet Albert RN)

Activity Restrictions:  No Exercising; Minimize Walking; No Sexual

   Activity; Nothing in Vagina - [place], Tampons, Douche   

   (01/29/2017 18:42:Yanet Albert RN)

Instructions Given To:  Patient    (01/29/2017 18:42:Yanet Albert RN)

Instructions Understood:  Patient Verbalized Understanding   

   (01/29/2017 18:42:Yanet Albert RN)

Referrals:  None    (01/29/2017 18:42:Yanet Albert RN)

Educational Materials- Other:  UTI    (01/29/2017 18:42:Yanet Albert RN)

Discharged AMA:  No    (02/01/2017 02:01:Shayna Reyna RN)

Discharged AMA:  No    (01/29/2017 18:42:Yanet Albert RN)

Discharge Date/Time:  02/01/2017 01:52    (02/01/2017 02:01:Shayna Reyna RN)

Discharge Date/Time:  01/29/2017 18:20    (01/29/2017 18:42:Yanet Albert RN)

Discharged To:  Other    (02/01/2017 02:01:Shayna Reyna RN)

Discharged To:  Home    (01/29/2017 18:42:Yanet Albert RN)

Discharge Provider Name:  Dr. Lomeli    (02/01/2017 02:01:Shayna Reyna RN)

Discharge Provider Name:  Dr Kessler    (01/29/2017 18:42:Yanet Albert RN)

Accompanied By:  Self    (02/01/2017 02:01:Shayna Reyna RN)

Accompanied By:  Family    (01/29/2017 18:42:Yanet Albert RN)

Discharge Method:  Stretcher    (02/01/2017 02:01:Shayna Reyna RN)

Discharge Method:  Wheelchair    (01/29/2017 18:42:Yanet Albert RN)

Condition:  Stable    (02/01/2017 02:01:Shayna Reyna RN)

Condition:  Stable    (01/29/2017 18:42:Yanet Albert RN)

Follow Up With:  [facility]    (01/29/2017

   18:42:Yanet Albert RN)

Follow Up On:  As Scheduled    (01/29/2017 18:42:Yanet Albert RN)

Comments:  Patient SROM and [facility]    (02/01/2017 02:01:Shayna Reyna RN)

Comments:  s/s to report to provider (Annotations: Data stored by CPN

   on behalf of user)    (01/29/2017 18:42:Yanet Albert RN)

## 2017-02-01 NOTE — L&D DISCHARGE SUMMARY
=================================================================

OB Discharge Summary

=================================================================

Datetime Report Generated by CPN: 02/01/2017 04:45

   

   

=================================================================

DISCHARGE DIAGNOSIS

=================================================================

   

Diagnosis/Symptoms:  Other

Diagnoses/Symptoms Other:  Patient SROM and was transported to [facility]

Gestation:  24.3

Number of Babies in Womb:  1

Parity:  3

   

=================================================================

DIET/ACTIVITY/RESTRICTIONS

=================================================================

   

Diet:  Regular

Activity:  Normal Activity

Activity Restrictions:  No Exercising; Minimize Walking; No Sexual

   Activity; Nothing in Vagina - [place], [place]ampons, Douche

   

=================================================================

TEACHING/INSTRUCTIONS/REFERRALS

=================================================================

   

Instructions Given To:  Patient

Instructions Understood:  Patient Verbalized Understanding

Referrals:  None

Educational Materials- Other:  UTI

   

=================================================================

DISCHARGE INFORMATION

=================================================================

   

Discharged AMA:  No

Discharge Date/Time:  02/01/2017 01:52

Discharged To:  Other

Discharge Provider Name:  Dr. Lomeli

Accompanied By:  Self

Discharge Method:  Stretcher

Condition:  Stable

   

=================================================================

FOLLOW UP INFORMATION

=================================================================

   

Follow Up With:  [facility]

Follow Up On:  As Scheduled

Follow Up Phone Number:  [facility] - [phone]

Comments:  Patient SROM and was transported via [facility] to [facility].

   

=================================================================

GENERAL INSTR-CALL PROVIDER IF:

=================================================================

   

Contractions:  Contractions or cramps become more frequent than 8 in

   one hour or 4 in 20 minutes; Regular painful contractions every 5

   minutes or less for one hour.  Time your contractions from the

   beginning of one to the beginning of the next

Pressure:  Pressure in your vagina or lower abdomen that may feel like

   the baby is pushing down

Period Like Cramps:  Period-like cramps or low dull backache that may

   come and go

Cramps/Diarrhea:  Abdominal cramps that may be accompanied by diarrhea

Gush of Fluid/Blood:  Gush of fluid or blood from your vagina (it is

   normal to have spotting after vaginal exam or intercourse)

Vaginal Discharge:  Change in the type or amount of vaginal discharge

Decreased Fetal Movement:  Your baby is not moving as much as usual- 4

   fetal movements in 1 hour after drinking and resting on side

Temperature:  Temperature greater than 100.0(F) orally

## 2017-08-21 ENCOUNTER — HOSPITAL ENCOUNTER (EMERGENCY)
Dept: HOSPITAL 62 - ER | Age: 37
Discharge: HOME | End: 2017-08-21
Payer: MEDICAID

## 2017-08-21 VITALS — DIASTOLIC BLOOD PRESSURE: 43 MMHG | SYSTOLIC BLOOD PRESSURE: 102 MMHG

## 2017-08-21 DIAGNOSIS — Z3A.12: ICD-10-CM

## 2017-08-21 DIAGNOSIS — O26.91: Primary | ICD-10-CM

## 2017-08-21 DIAGNOSIS — Z91.013: ICD-10-CM

## 2017-08-21 DIAGNOSIS — M79.1: ICD-10-CM

## 2017-08-21 DIAGNOSIS — R53.1: ICD-10-CM

## 2017-08-21 DIAGNOSIS — G44.209: ICD-10-CM

## 2017-08-21 DIAGNOSIS — R73.9: ICD-10-CM

## 2017-08-21 LAB
ALBUMIN SERPL-MCNC: 4.6 G/DL (ref 3.5–5)
ALP SERPL-CCNC: 70 U/L (ref 38–126)
ALT SERPL-CCNC: 26 U/L (ref 9–52)
ANION GAP SERPL CALC-SCNC: 14 MMOL/L (ref 5–19)
APPEARANCE UR: CLEAR
AST SERPL-CCNC: 17 U/L (ref 14–36)
BASOPHILS # BLD AUTO: 0 10^3/UL (ref 0–0.2)
BASOPHILS NFR BLD AUTO: 0.1 % (ref 0–2)
BILIRUB DIRECT SERPL-MCNC: 0.4 MG/DL (ref 0–0.4)
BILIRUB SERPL-MCNC: 0.5 MG/DL (ref 0.2–1.3)
BILIRUB UR QL STRIP: NEGATIVE
BUN SERPL-MCNC: 5 MG/DL (ref 7–20)
CALCIUM: 10.2 MG/DL (ref 8.4–10.2)
CHLORIDE SERPL-SCNC: 105 MMOL/L (ref 98–107)
CK SERPL-CCNC: 39 U/L (ref 30–135)
CO2 SERPL-SCNC: 21 MMOL/L (ref 22–30)
CREAT SERPL-MCNC: 0.45 MG/DL (ref 0.52–1.25)
EOSINOPHIL # BLD AUTO: 0 10^3/UL (ref 0–0.6)
EOSINOPHIL NFR BLD AUTO: 0 % (ref 0–6)
ERYTHROCYTE [DISTWIDTH] IN BLOOD BY AUTOMATED COUNT: 17 % (ref 11.5–14)
GLUCOSE SERPL-MCNC: 140 MG/DL (ref 75–110)
GLUCOSE UR STRIP-MCNC: 150 MG/DL
HCT VFR BLD CALC: 36.3 % (ref 36–47)
HGB BLD-MCNC: 12.2 G/DL (ref 12–15.5)
HGB HCT DIFFERENCE: 0.3
KETONES UR STRIP-MCNC: (no result) MG/DL
LYMPHOCYTES # BLD AUTO: 1.1 10^3/UL (ref 0.5–4.7)
LYMPHOCYTES NFR BLD AUTO: 11.3 % (ref 13–45)
MAGNESIUM SERPL-MCNC: 1.9 MG/DL (ref 1.6–2.3)
MCH RBC QN AUTO: 26.8 PG (ref 27–33.4)
MCHC RBC AUTO-ENTMCNC: 33.7 G/DL (ref 32–36)
MCV RBC AUTO: 80 FL (ref 80–97)
MONOCYTES # BLD AUTO: 0.3 10^3/UL (ref 0.1–1.4)
MONOCYTES NFR BLD AUTO: 2.5 % (ref 3–13)
NEUTROPHILS # BLD AUTO: 8.7 10^3/UL (ref 1.7–8.2)
NEUTS SEG NFR BLD AUTO: 86.1 % (ref 42–78)
NITRITE UR QL STRIP: NEGATIVE
PH UR STRIP: 5 [PH] (ref 5–9)
POTASSIUM SERPL-SCNC: 4.2 MMOL/L (ref 3.6–5)
PROT SERPL-MCNC: 8.4 G/DL (ref 6.3–8.2)
PROT UR STRIP-MCNC: NEGATIVE MG/DL
RBC # BLD AUTO: 4.57 10^6/UL (ref 3.72–5.28)
SODIUM SERPL-SCNC: 139.5 MMOL/L (ref 137–145)
SP GR UR STRIP: 1.01
UROBILINOGEN UR-MCNC: NEGATIVE MG/DL (ref ?–2)
WBC # BLD AUTO: 10.1 10^3/UL (ref 4–10.5)

## 2017-08-21 PROCEDURE — 99285 EMERGENCY DEPT VISIT HI MDM: CPT

## 2017-08-21 PROCEDURE — 82550 ASSAY OF CK (CPK): CPT

## 2017-08-21 PROCEDURE — 96361 HYDRATE IV INFUSION ADD-ON: CPT

## 2017-08-21 PROCEDURE — 96374 THER/PROPH/DIAG INJ IV PUSH: CPT

## 2017-08-21 PROCEDURE — 85025 COMPLETE CBC W/AUTO DIFF WBC: CPT

## 2017-08-21 PROCEDURE — 83735 ASSAY OF MAGNESIUM: CPT

## 2017-08-21 PROCEDURE — 80053 COMPREHEN METABOLIC PANEL: CPT

## 2017-08-21 PROCEDURE — 81001 URINALYSIS AUTO W/SCOPE: CPT

## 2017-08-21 PROCEDURE — 36415 COLL VENOUS BLD VENIPUNCTURE: CPT

## 2017-08-21 NOTE — ER DOCUMENT REPORT
ED Medical Screen (RME)





- General


Chief Complaint: Weakness


Stated Complaint: ABDOMINAL PAIN


Time Seen by Provider: 17 14:23


Notes: 


The patient is a 37-year-old female, , 11 weeks pregnant, Hx fibromyalgia, 

Guillain-Barr syndrome (requiring intubation during last pregnancy), presents 

with increased bilateral arm and leg weakness.  She had this in the past when 

her potassium and magnesium were low and when she had Guillain-Barr syndrome.  

Patient was seen at [facility] last night for shortness of breath and had a 

negative CTA for PE.  When she went to the health department today, she was 

sent to the ER for further evaluation.





PE: 4/5 strength in all 4 extremities. No respiratory distress. RRR.





I have greeted and performed a rapid initial assessment of this patient.  A 

comprehensive ED assessment and evaluation of the patient, analysis of test 

results and completion of the medical decision making process will be conducted 

by additional ED providers.


TRAVEL OUTSIDE OF THE [place] IN LAST 30 DAYS: No





- Related Data


Allergies/Adverse Reactions: 


 





iodine Allergy (Verified 10/15/16 10:14)


 


seafood Allergy (Uncoded 17 14:21)


 











Past Medical History





- Past Medical History


Cardiac Medical History: Reports: Hx DVT


Renal/ Medical History: Denies: Hx Peritoneal Dialysis


GI Medical History: Reports: Hx Gastritis, Hx Gastroesophageal Reflux Disease, 

Hx Irritable Bowel


Musculoskeltal Medical History: Reports Hx Musculoskeletal Trauma


Psychiatric Medical History: Reports: Hx Anxiety, Hx Depression, Hx Post 

Traumatic Stress Disorder


Past Surgical History: Reports: Hx Appendectomy, Hx Oral Surgery





- Immunizations


Hx Diphtheria, Pertussis, Tetanus Vaccination: Yes





Physical Exam





- Vital signs


Vitals: 





 











Temp Pulse Resp BP Pulse Ox


 


 98.8 F   86   16   117/73   98 


 


 17 14:21  17 14:21  17 14:21  17 14:21  17 14:21














Course





- Vital Signs


Vital signs: 





 











Temp Pulse Resp BP Pulse Ox


 


 98.8 F   86   16   117/73   98 


 


 17 14:21  17 14:21  17 14:21  17 14:21  17 14:21

## 2017-08-21 NOTE — ER DOCUMENT REPORT
ED Headache





- General


Chief Complaint: Weakness


Stated Complaint: ABDOMINAL PAIN


Time Seen by Provider: 17 14:23


Mode of Arrival: Ambulatory


Information source: Patient


Notes: 





38 yo  12 weeks pregnant c/o arms and legs feel weak, pins and needles up 

both legs from foot to upper calf since 0700, same feeling both hands to lower 

3rd of forearms since 0700, last evening head feels like it is going to explode-

frontal to posterior occiput 5/5 (no hx migraines), and low pelvic pain with 

sharpness. No vaginal bleeding. Seen at [facility] last night while visiting

, CTA negative, Dilaudid 0.5mg no relieft for HA, TV US baby fine. [facility]. Previous HA last pregnancy in last month, born 3 months early at 

23 weeks (cerclage).  "I have a high tolerance for pain medications) Tylenol 

not helping. No fever. No runny nose or cough. NO N/V/D. Always constipated. No 

dysuria. 2009 [place] that started with rash. She does not think it is 

GBS, similar sx were due to low potassium and magnesium. States she is stressed 

out. C section february.


TRAVEL OUTSIDE OF THE [place] IN LAST 30 DAYS: No





- Related Data


Allergies/Adverse Reactions: 


 





iodine Allergy (Verified 17 18:13)


 


seafood Allergy (Uncoded 17 18:13)


 











Past Medical History





- General


Information source: Patient





- Social History


Smoking Status: Never Smoker


Chew tobacco use (# tins/day): No


Frequency of alcohol use: None


Drug Abuse: None


Lives with: Spouse/Significant other


Family History: Reviewed & Not Pertinent, Arthritis, CAD, CVA, DM, 

Hyperlipidemia, Hypertension, Malignancy


Patient has suicidal ideation: No


Patient has homicidal ideation: No





- Past Medical History


Cardiac Medical History: Reports: Hx DVT


Renal/ Medical History: Denies: Hx Peritoneal Dialysis


GI Medical History: Reports: Hx Gastritis, Hx Gastroesophageal Reflux Disease, 

Hx Irritable Bowel


Musculoskeltal Medical History: Reports Hx Musculoskeletal Trauma


Psychiatric Medical History: Reports: Hx Anxiety, Hx Depression, Hx Post 

Traumatic Stress Disorder


Infectious Medical History: Reports: Other - guillian garre syndrome


Past Surgical History: Reports: Hx Appendectomy, Hx Oral Surgery





- Immunizations


Hx Diphtheria, Pertussis, Tetanus Vaccination: Yes





Review of Systems





- Review of Systems


Constitutional: No symptoms reported


EENT: No symptoms reported


Cardiovascular: No symptoms reported


Respiratory: No symptoms reported


Gastrointestinal: No symptoms reported


Genitourinary: No symptoms reported


Female Genitourinary: No symptoms reported


Musculoskeletal: See HPI


Skin: No symptoms reported


Hematologic/Lymphatic: No symptoms reported


Neurological/Psychological: See HPI





Physical Exam





- Vital signs


Vitals: 


 











Temp Pulse Resp BP Pulse Ox


 


 98.8 F   86   16   117/73   98 


 


 17 14:21  17 14:21  17 14:21  17 14:21  17 14:21











Interpretation: Normal





- General


General appearance: Appears well, Alert


In distress: None





- HEENT


Head: Normocephalic, Atraumatic


Eyes: Normal


Conjunctiva: Normal


Extraocular movements intact: Yes


Pupils: PERRL


Nerve palsy: No


Pharynx: Normal


Neck: Supple.  No: Lymphadenopathy


Notes: 





tender left trapezius muscle to the occipital.





- Respiratory


Respiratory status: No respiratory distress


Chest status: Nontender


Breath sounds: Normal


Chest palpation: Normal





- Cardiovascular


Rhythm: Regular


Heart sounds: Normal auscultation


Murmur: No





- Abdominal


Inspection: Normal


Distension: No distension


Bowel sounds: Normal


Tenderness: Tender - see below


Organomegaly: No organomegaly


Notes: 





mild tender over c section scar which she says she always has





- Back


Back: Normal, Nontender





- Extremities


General upper extremity: Normal inspection, Nontender, Normal color, Normal ROM

, Normal temperature


General lower extremity: Normal inspection, Nontender, Normal color, Normal ROM

, Normal temperature, Normal weight bearing.  No: Rita's sign





- Neurological


Neuro grossly intact: Yes


Cognition: Normal


Orientation: AAOx4


[place] Coma Scale Eye Opening: Spontaneous


[place] Coma Scale Verbal: Oriented


[place] Coma Scale Motor: Obeys Commands


[place] Coma Scale Total: 15


Speech: Normal


Motor strength normal: LUE, RUE, LLE, RLE


Sensory: Altered light touch - left dorsal hand, but not as bad as it was 

earlier.





- Psychological


Associated symptoms: Normal affect, Normal mood





- Skin


Skin Temperature: Warm


Skin Moisture: Dry


Skin Color: Normal


Skin irregularity: negative: Rash





Course





- Re-evaluation


Re-evalutation: 





17 17:04


.


17 17:07


pt states she is not as weak as she was when she called the ambulance. able to 

text on phone now.


17 17:54


Patient states that the Benadryl has not helped the headache.  I consulted with 

, no other category B meds for headache. She states that the 

dilaudid 0.5 mg IV given at [facility] did not help the headache at all.


17 18:05


Patient does not want a risk taking a category C medication when I discussed 

the possible risks to fetus.  States she is willing to manage a headache at 

home.  She is already had 3000 mg of Tylenol today and I explained to her that 

4000 mg is the absolute maximum per day.


17 18:38


pt has ride home, wants flexeril now. talked with her at length about the need 

for recheck on glucose.





- Vital Signs


Vital signs: 


 











Temp Pulse Resp BP Pulse Ox


 


 98.8 F   86   16   117/73   98 


 


 17 14:21  17 14:21  17 14:21  17 14:21  17 14:21














- Laboratory


Result Diagrams: 


 17 15:12





 17 15:12


Laboratory results interpreted by me: 


 











  17





  15:12 15:12 16:54


 


MCH  26.8 L  


 


RDW  17.0 H  


 


Seg Neutrophils %  86.1 H  


 


Lymphocytes %  11.3 L  


 


Monocytes %  2.5 L  


 


Absolute Neutrophils  8.7 H  


 


Carbon Dioxide   21 L 


 


BUN   5 L 


 


Creatinine   0.45 L 


 


Glucose   140 H 


 


Total Protein   8.4 H 


 


Urine Glucose (UA)    150 H


 


Urine Ketones    TRACE H


 


Urine Blood    MODERATE H














Discharge





- Discharge


Clinical Impression: 


 Hyperglycemia, neck muscle tenderness, pregnancy





Headache


Qualifiers:


 Headache type: tension-type Headache chronicity pattern: acute headache 

Intractability: not intractable Qualified Code(s): G44.209 - Tension-type 

headache, unspecified, not intractable





Condition: Good


Disposition: HOME, SELF-CARE


Instructions:  Tension Headache ([facility]), Pregnancy ([facility])


Additional Instructions: 


warm compress to neck muscles


see health department for recheck and glucose recheck- since it was 140 today 

and your history, you need to be checked for gestational diabetes


copy of all labs given to you


referral to neurology for headaches and the numbness


to er if worse





Please complete the patient satisfaction survey if you get one, and return it.. 

If you do not receive a survey,  then you can go to the [facility] website, [email or web address] 

and place your comments about your very good care. Thank you very much. It was 

a pleasure being your medical provider today.


Prescriptions: 


Cyclobenzaprine HCl [Flexeril 10 Mg Tablet] 10 mg PO TIDP PRN #20 tablet


 PRN Reason: 


Referrals: 


CYNDEE MAN MD [Primary Care Provider] - Follow up as needed

## 2018-06-11 ENCOUNTER — HOSPITAL ENCOUNTER (EMERGENCY)
Dept: HOSPITAL 62 - ER | Age: 38
Discharge: HOME | End: 2018-06-11
Payer: SELF-PAY

## 2018-06-11 VITALS — SYSTOLIC BLOOD PRESSURE: 133 MMHG | DIASTOLIC BLOOD PRESSURE: 83 MMHG

## 2018-06-11 DIAGNOSIS — R10.30: ICD-10-CM

## 2018-06-11 DIAGNOSIS — R30.9: ICD-10-CM

## 2018-06-11 DIAGNOSIS — N30.00: Primary | ICD-10-CM

## 2018-06-11 DIAGNOSIS — Z86.718: ICD-10-CM

## 2018-06-11 LAB
APPEARANCE UR: (no result)
APTT PPP: YELLOW S
BILIRUB UR QL STRIP: NEGATIVE
GLUCOSE UR STRIP-MCNC: NEGATIVE MG/DL
KETONES UR STRIP-MCNC: NEGATIVE MG/DL
NITRITE UR QL STRIP: NEGATIVE
PH UR STRIP: 7 [PH] (ref 5–9)
PROT UR STRIP-MCNC: 100 MG/DL
SP GR UR STRIP: 1.02
UROBILINOGEN UR-MCNC: NEGATIVE MG/DL (ref ?–2)

## 2018-06-11 PROCEDURE — 81001 URINALYSIS AUTO W/SCOPE: CPT

## 2018-06-11 PROCEDURE — 81025 URINE PREGNANCY TEST: CPT

## 2018-06-11 PROCEDURE — 99283 EMERGENCY DEPT VISIT LOW MDM: CPT

## 2018-06-11 NOTE — ER DOCUMENT REPORT
ED General





- General


Chief Complaint: Pain With Urination


Stated Complaint: URINARY ISSUE


Time Seen by Provider: 06/11/18 15:36


Mode of Arrival: Ambulatory


Information source: Patient


Notes: 





38-year-old female presents with complaints of suprapubic pressure pain burning 

with urination.  Patient notes symptoms at this morning feels like his previous 

UTI denies any fever chills nausea vomiting or diarrhea patient delivered 3 

months ago notes she used to get UTIs often throughout the pregnancy


TRAVEL OUTSIDE OF THE [place] IN LAST 30 DAYS: No





- HPI


Onset: This morning


Onset/Duration: Sudden


Quality of pain: Pressure


Severity: Moderate


Pain Level: 2


Associated symptoms: Other


Exacerbated by: Other - Urination


Relieved by: Denies


Similar symptoms previously: Yes


Recently seen / treated by doctor: Yes





- Related Data


Allergies/Adverse Reactions: 


 





iodine Allergy (Verified 06/11/18 14:48)


 


seafood Allergy (Uncoded 06/11/18 14:48)


 











Past Medical History





- Social History


Smoking Status: Never Smoker


Cigarette use (# per day): No


Chew tobacco use (# tins/day): No


Smoking Education Provided: No


Frequency of alcohol use: Rare


Drug Abuse: None


Family History: Reviewed & Not Pertinent, Arthritis, CAD, CVA, DM, 

Hyperlipidemia, Hypertension, Malignancy


Patient has suicidal ideation: No


Patient has homicidal ideation: No





- Past Medical History


Cardiac Medical History: Reports: Hx DVT


Renal/ Medical History: Denies: Hx Peritoneal Dialysis


GI Medical History: Reports: Hx Gastritis, Hx Gastroesophageal Reflux Disease, 

Hx Irritable Bowel


Musculoskeltal Medical History: Reports Hx Musculoskeletal Trauma


Psychiatric Medical History: Reports: Hx Anxiety, Hx Depression, Hx Post 

Traumatic Stress Disorder


Past Surgical History: Reports: Hx Appendectomy, Hx Oral Surgery





- Immunizations


Hx Diphtheria, Pertussis, Tetanus Vaccination: Yes





Review of Systems





- Review of Systems


Notes: 





REVIEW OF SYSTEMS:


CONSTITUTIONAL :  Denies fever,  chills, or sweats.  Denies recent illness.


EENT:   Denies eye, ear, throat, or mouth pain or symptoms.  Denies nasal or 

sinus congestion or discharge.  Denies throat, tongue, or mouth swelling or 

difficulty swallowing.


CARDIOVASCULAR:  Denies chest pain.  Denies palpitations or racing or irregular 

heart beat.  Denies ankle edema.


RESPIRATORY:  Denies cough, cold, or chest congestion.  Denies shortness of 

breath, difficulty breathing, or wheezing.


GASTROINTESTINAL: Admits pressure


GENITOURINARY: Admits to difficulty urinating


FEMALE  GENITOURINARY:  Denies vaginal bleeding, heavy or abnormal periods, 

irregular periods.  Denies vaginal discharge or odor. 


MUSCULOSKELETAL:  Denies back or neck pain or stiffness.  Denies joint pain or 

swelling.


SKIN:   Denies rash, lesions or sores.


HEMATOLOGIC :   Denies easy bruising or bleeding.


LYMPHATIC:  Denies swollen, enlarged glands.


NEUROLOGICAL:  Denies confusion or altered mental status.  Denies passing out 

or loss of consciousness.  Denies dizziness or lightheadedness.  Denies 

headache.  Denies weakness or paralysis or loss of use of either side.  Denies 

problems with gait or speech.  Denies sensory loss, numbness, or tingling.  

Denies seizures.


PSYCHIATRIC:  Denies anxiety or stress.  Denies depression, suicidal ideation, 

or homicidal ideation.





ALL OTHER SYSTEMS REVIEWED AND NEGATIVE.











PHYSICAL EXAMINATION:





GENERAL: Well-appearing, well-nourished and in no acute distress.





HEAD: Atraumatic, normocephalic.





EYES: Pupils equal round and reactive to light, extraocular movements intact, 

conjunctiva are normal.





ENT: Nares patent, oropharynx clear without exudates.  Moist mucous membranes.





NECK: Normal range of motion, supple without lymphadenopathy





LUNGS: Breath sounds clear to auscultation bilaterally and equal.  No wheezes 

rales or rhonchi.





HEART: Regular rate and rhythm without murmurs





ABDOMEN: Soft, tender in the suprapubic region





Female : deferred





Musculoskeletal: Normal range of motion, no pitting or edema.  No cyanosis.





NEUROLOGICAL: Cranial nerves grossly intact.  Normal speech, normal gait.  

Normal sensory, motor exams





PSYCH: Normal mood, normal affect.





SKIN: Warm, Dry, normal turgor, no rashes or lesions noted.

















Dictation was performed using Dragon voice recognition software





Physical Exam





- Vital signs


Vitals: 


 











Temp Pulse Resp BP Pulse Ox


 


 98.7 F   81   20   126/79 H  99 


 


 06/11/18 14:51  06/11/18 14:51  06/11/18 14:51  06/11/18 14:51  06/11/18 14:51














Course





- Re-evaluation


Re-evalutation: 





06/11/18 15:49


Patient has probable UTI, overall looks well, will give Pyridium for systematic 

relief


06/11/18 17:01


Patient's urinalysis is consistent with a UTI, patient will be placed on 

antibiotics and is otherwise stable for discharge


She has no systemic signs of any life-threatening issues








After performing a Medical Screening Examination, I estimate there is LOW risk 

for ACUTE APPENDICITIS, BOWEL OBSTRUCTION, ACUTE CHOLECYSTITIS, PERFORATED 

DIVERTICULITIS, INCARCERATED HERNIA, PANCREATITIS, PELVIC INFLAMMATORY DISEASE, 

PERFORATED ULCER, ECTOPIC PREGNANCY, or TUBO-OVARIAN ABSCESS, thus I consider 

the discharge disposition reasonable. Also, there is no evidence or peritonitis

, sepsis, or toxicity. I have reevaluated this patient multiple times and no 

significant life threatening changes are noted. The patient and I have 

discussed the diagnosis and risks, and we agree with discharging home with 

close follow-up with the understanding that symptoms and presentations can 

change. We also discussed returning to the Emergency Department immediately if 

new or worsening symptoms occur. We have discussed the symptoms which are most 

concerning (e.g., bloody stool, fever, changing or worsening pain, vomiting) 

that necessitate immediate return.





- Vital Signs


Vital signs: 


 











Temp Pulse Resp BP Pulse Ox


 


 98.2 F   78   18   133/83 H  99 


 


 06/11/18 16:40  06/11/18 16:40  06/11/18 16:40  06/11/18 16:40  06/11/18 16:40














- Laboratory


Laboratory results interpreted by me: 


 











  06/11/18





  16:00


 


Urine Protein  100 H


 


Urine Blood  MODERATE H


 


Ur Leukocyte Esterase  LARGE H














Discharge





- Discharge


Clinical Impression: 


UTI (urinary tract infection)


Qualifiers:


 Urinary tract infection type: acute cystitis Hematuria presence: without 

hematuria Qualified Code(s): N30.00 - Acute cystitis without hematuria





Condition: Stable


Disposition: HOME, SELF-CARE


Instructions:  Urinary Tract Infection ([facility])


Additional Instructions: 


Follow up with your physician tomorrow for further care or return to the ED 

IMMEDIATELY if symptoms worsen or new concerns occur. If you cannot afford to 

follow up with your primary care physician a list of low cost clinics have been 

provided at the end of your discharge papers as well.


Prescriptions: 


Ciprofloxacin HCl [Cipro 500 mg Tablet] 500 mg PO BID #10 tablet


Phenazopyridine HCl [Pyridium 200 mg Tablet] 200 mg PO TID #9 tablet

## 2018-10-13 NOTE — RADIOLOGY REPORT (SQ)
EXAM DESCRIPTION:  ACUTE ABDOMEN SERIES



COMPLETED DATE/TIME:  10/13/2018 12:04 pm



REASON FOR STUDY:  abd pain



COMPARISON:  None.



NUMBER OF VIEWS:  Three views.



TECHNIQUE:  PA chest, supine abdomen and upright/decubitus abdomen radiographic images acquired.



LIMITATIONS:  None.



FINDINGS:  CHEST: Lungs clear of infiltrates.

FREE AIR: None. No abnormal gas collections.

BOWEL GAS PATTERN: Few scattered small bowel loops with air fluid levels. No distended large or small
 bowel loops.

CALCIFICATIONS: No suspicious calcifications.

HARDWARE: None in the abdomen.

SOFT TISSUES: No gross mass or suggestion of organomegaly.

BONES: No acute fracture. No worrisome bone lesions.

OTHER: No other significant finding.



IMPRESSION:  NONSPECIFIC BOWEL GAS PATTERN WITHOUT EVIDENCE FOR OBSTRUCTION.



TECHNICAL DOCUMENTATION:  JOB ID:  [id number]

[id number]

 2011 [employer]- All Rights Reserved



Reading location - IP/workstation name: [email or web address]

## 2018-10-13 NOTE — ER DOCUMENT REPORT
ED GI/





- General


Chief Complaint: Upper Abdominal Pain


Stated Complaint: BACK PAIN


Time Seen by Provider: 10/13/18 11:25


Mode of Arrival: Ambulatory


Information source: Patient


Notes: 





Patient presents complaining of a 3-week history of intermittent back spasms 

that radiate from her thoracic area around to the upper abdomen.  Patient 

states that she has had these spasms off and on for many months although 

yesterday the frequency of the spasmodic episodes increased.  Patient is 3 

weeks status post cholecystectomy.  Patient states that she had this pain prior 

to having her gallbladder out and that this pain is different than the pain she 

had with her gallbladder.  Patient does report nausea and diarrhea x4 episodes.

  Patient denies any fever or urinary symptoms.  Patient did drive here from 

[place] yesterday and plans to return home in 4 days.  Patient does have a 

follow-up appointment with her surgeon this week and has scheduled a follow-up 

appointment with her primary doctor next week.  Patient states that she saw her 

primary doctor 4 days ago and was placed on baclofen.  Patient states she is 

attempted to use baclofen as well as Flexeril without improvement of her 

symptoms.  Patient presently denies pain although states that it comes and 

goes.  Patient without any chest pain or shortness of breath.


TRAVEL OUTSIDE OF THE [place] IN LAST 30 DAYS: No





- HPI


Patient complains to provider of: Abdominal pain, Other - Back pain that 

radiates around to the abdomen


Onset: Other - 3 weeks


Timing/Duration: Waxing and waning


Quality of pain: Other - Muscle spasm


Pain Level: 1


Location: Epigastric, LUQ


Vaginal bleeding (Compared to normal period): None


Associated symptoms: Diarrhea, Nausea.  denies: Chest pain, Dysuria, Fever, 

Urinary hesitancy, Urinary frequency, Urinary retention, Vomiting


Exacerbated by: Denies


Relieved by: Denies


Similar symptoms previously: Yes


Recently seen / treated by doctor: Yes





- Related Data


Allergies/Adverse Reactions: 


 





iodine Allergy (Verified 10/13/18 11:06)


 


seafood Allergy (Uncoded 10/13/18 11:06)


 











Past Medical History





- General


Information source: Patient





- Social History


Smoking Status: Never Smoker


Frequency of alcohol use: Occasional


Drug Abuse: None


Lives with: Family


Family History: Reviewed & Not Pertinent, Arthritis, CAD, CVA, DM, 

Hyperlipidemia, Hypertension, Malignancy


Patient has suicidal ideation: No


Patient has homicidal ideation: No





- Medical History


Medical History: Other - [place]





- Past Medical History


Cardiac Medical History: Reports: Hx DVT, Other - Rheumatic fever


Renal/ Medical History: Denies: Hx Peritoneal Dialysis


GI Medical History: Reports: Hx Gastritis, Hx Gastroesophageal Reflux Disease, 

Hx Irritable Bowel


Musculoskeletal Medical History: Reports Hx Fibromyalgia, Reports Hx 

Musculoskeletal Trauma


Psychiatric Medical History: Reports: Hx Anxiety, Hx Depression, Hx Post 

Traumatic Stress Disorder


Past Surgical History: Reports: Hx Appendectomy, Hx  Section - x2, Hx 

Cholecystectomy - 2018, Hx Oral Surgery





- Immunizations


Hx Diphtheria, Pertussis, Tetanus Vaccination: Yes





Review of Systems





- Review of Systems


Constitutional: No symptoms reported.  denies: Fever, Recent illness


EENT: No symptoms reported


Cardiovascular: No symptoms reported.  denies: Chest pain


Respiratory: No symptoms reported.  denies: Cough, Short of breath


Gastrointestinal: Abdominal pain, Diarrhea, Nausea.  denies: Vomiting, Poor 

appetite


Genitourinary: No symptoms reported.  denies: Dysuria, Flank pain


Female Genitourinary: No symptoms reported.  denies: Vaginal discharge


Musculoskeletal: Back pain


Skin: No symptoms reported


Hematologic/Lymphatic: No symptoms reported


Neurological/Psychological: No symptoms reported





Physical Exam





- Vital signs


Vitals: 


 











Temp Pulse Resp BP Pulse Ox


 


 98.7 F   83   14   101/60   97 


 


 10/13/18 11:10  10/13/18 11:10  10/13/18 11:10  10/13/18 11:10  10/13/18 11:10














- General


General appearance: Appears well, Alert


In distress: None





- HEENT


Head: Normocephalic, Atraumatic


Eyes: Normal


Conjunctiva: Normal


Nasal: Normal


Mouth/Lips: Normal


Mucous membranes: Normal


Neck: Normal, Supple.  No: Lymphadenopathy





- Respiratory


Respiratory status: No respiratory distress.  No: Tachypnea


Chest status: Nontender


Breath sounds: Normal.  No: Rales, Rhonchi, Stridor


Chest palpation: Normal





- Cardiovascular


Rhythm: Regular.  No: Tachycardia


Heart sounds: S1 appreciated, S2 appreciated





- Abdominal


Inspection: Morbidly Obese


Bowel sounds: Normal


Tenderness: Tender - epigastric, LUQ.  No: Guarding


Organomegaly: No organomegaly





- Back


Back: Normal, Nontender.  No: CVA tenderness





- Extremities


General upper extremity: Normal inspection, Normal ROM


General lower extremity: Normal inspection, Normal ROM.  No: Edema





- Neurological


[place] Coma Scale Eye Opening: Spontaneous


[place] Coma Scale Verbal: Oriented


[place] Coma Scale Motor: Obeys Commands


[place] Coma Scale Total: 15


Motor strength normal: LUE, RUE, LLE, RLE





- Psychological


Associated symptoms: Normal affect, Normal mood





- Skin


Skin Temperature: Warm


Skin Moisture: Dry


Skin Color: Normal





Course





- Re-evaluation


Re-evalutation: 





10/13/18 12:50


consulted with Dr Pruett regarding pt presentation and diagnostic evaluation.  

Patient without any fever, leukocytosis or abnormal vital signs.  Patient 

without any chest pain or dyspnea symptoms.  Patient with reproducible 

epigastric and left upper quadrant abdominal tenderness with palpation.  No 

concern for pulmonary embolus at this time with no hypoxia, tachycardia, chest 

or respiratory symptoms.  Patient insistent that his pain is pain that she has 

had prior to having her gallbladder out and that she describes it as a muscle 

spasm-like pain that comes and goes but increased in frequency which prompted 

her visit today.  Patient states she did see her primary doctor for this 

complaint recently was placed on baclofen, although states that the baclofen 

has not been helping her symptoms.


10/13/18 13:54


Patient states that in the past she has had pain like this before and that her 

symptoms were managed with Robaxin.  Patient is not requesting any narcotic 

pain medications at this time.  Patient complains of only mild soreness to the 

epigastric and left upper quadrant area that she states is a result of her 

previous muscle spasm pain.  Patient without any chest pain or dyspnea.  

Abdomen is otherwise soft nontender without any guarding.  Patient presents 

with abdominal pain without signs of peritonitis or other life-threatening or 

serious etiology.  Patient appears stable for discharge and has been instructed 

to return immediately if the symptoms worsen in any way for reevaluation.  The 

patient has been instructed to return if the symptoms worsen or change in any 

way.








- Vital Signs


Vital signs: 


 











Temp Pulse Resp BP Pulse Ox


 


 98.6 F   84   16   118/75   100 


 


 10/13/18 14:14  10/13/18 14:14  10/13/18 14:14  10/13/18 14:14  10/13/18 14:14














- Laboratory


Result Diagrams: 


 10/13/18 11:45





 10/13/18 11:45


Laboratory results interpreted by me: 


 











  10/13/18 10/13/18 10/13/18





  11:45 11:45 11:45


 


Hgb  11.5 L  


 


Hct  34.7 L  


 


MCV  79 L  


 


MCH  26.2 L  


 


RDW  15.1 H  


 


Total Bilirubin   1.4 H 


 


Urine Blood    MODERATE H


 


Urine Ascorbic Acid    20 H











10/13/18 13:54





 Labs- Entire Visit











  10/13/18 10/13/18 10/13/18





  11:45 11:45 11:45


 


WBC  5.7  


 


RBC  4.37  


 


Hgb  11.5 L  


 


Hct  34.7 L  


 


MCV  79 L  


 


MCH  26.2 L  


 


MCHC  33.1  


 


RDW  15.1 H  


 


Plt Count  326  


 


Seg Neutrophils %  61.9  


 


Lymphocytes %  29.0  


 


Monocytes %  5.9  


 


Eosinophils %  2.7  


 


Basophils %  0.5  


 


Absolute Neutrophils  3.5  


 


Absolute Lymphocytes  1.6  


 


Absolute Monocytes  0.3  


 


Absolute Eosinophils  0.2  


 


Absolute Basophils  0.0  


 


Sodium   142.3 


 


Potassium   3.8 


 


Chloride   103 


 


Carbon Dioxide   28 


 


Anion Gap   11 


 


BUN   8 


 


Creatinine   0.54 


 


Est GFR ( Amer)   > 60 


 


Est GFR (Non-Af Amer)   > 60 


 


Glucose   97 


 


Calcium   9.5 


 


Magnesium   1.7 


 


Total Bilirubin   1.4 H 


 


Direct Bilirubin   0.3 


 


Neonat Total Bilirubin   Not Reportable 


 


Neonat Direct Bilirubin   Not Reportable 


 


Neonat Indirect Bili   Not Reportable 


 


AST   31 


 


ALT   38 


 


Alkaline Phosphatase   76 


 


Total Protein   8.1 


 


Albumin   4.4 


 


Lipase   85.5 


 


Urine Color    YELLOW


 


Urine Appearance    CLOUDY


 


Urine pH    5.0


 


Ur Specific Gravity    1.019


 


Urine Protein    NEGATIVE


 


Urine Glucose (UA)    NEGATIVE


 


Urine Ketones    NEGATIVE


 


Urine Blood    MODERATE H


 


Urine Nitrite    NEGATIVE


 


Urine Bilirubin    NEGATIVE


 


Urine Urobilinogen    NEGATIVE


 


Ur Leukocyte Esterase    NEGATIVE


 


Urine WBC (Auto)    5


 


Urine RBC (Auto)    7


 


Urine Bacteria (Auto)    TRACE


 


Squamous Epi Cells Auto    22


 


Urine Mucus (Auto)    MANY


 


Urine Ascorbic Acid    20 H


 


Urine HCG, Qual    NEGATIVE














- Diagnostic Test


Radiology reviewed: Image reviewed, Reports reviewed





Discharge





- Discharge


Clinical Impression: 


 Epigastric pain





Back pain


Qualifiers:


 Back pain location: thoracic back pain Chronicity: unspecified Back pain 

laterality: bilateral Qualified Code(s): M54.6 - Pain in thoracic spine





Condition: Stable


Disposition: HOME, SELF-CARE


Instructions:  Abdominal Pain ([facility]), Muscle Relaxers ([facility]), Upper Back Strain (

[facility])


Additional Instructions: 


Return immediately for any new or worsening symptoms





Followup with your primary care provider, call tomorrow to make a followup 

appointment





Take the Robaxin or the Flexeril/baclofen, not both medications together.


Prescriptions: 


Methocarbamol [Robaxin 750 mg Tablet] 750 mg PO ASDIR PRN #20 tablet


 PRN Reason: 


Referrals: 


[facility] [Provider Group] - Follow up as needed

## 2018-10-13 NOTE — ER DOCUMENT REPORT
ED Medical Screen (RME)





- General


Chief Complaint: Upper Abdominal Pain


Stated Complaint: BACK PAIN


Time Seen by Provider: 10/13/18 11:25


Mode of Arrival: Ambulatory


Information source: Patient


TRAVEL OUTSIDE OF THE [place] IN LAST 30 DAYS: No





- HPI


Patient complains to provider of: back spasms; abdominal pain


Onset: Other - pt. has beren having back spasms which recently has gone to her 

abdominal area causing abd pain.  Had recent GB surgery





- Related Data


Allergies/Adverse Reactions: 


 





iodine Allergy (Verified 10/13/18 11:06)


 


seafood Allergy (Uncoded 10/13/18 11:06)


 











Past Medical History





- Past Medical History


Cardiac Medical History: Reports: Hx DVT


Renal/ Medical History: Denies: Hx Peritoneal Dialysis


GI Medical History: Reports: Hx Gastritis, Hx Gastroesophageal Reflux Disease, 

Hx Irritable Bowel


Musculoskeltal Medical History: Reports Hx Musculoskeletal Trauma


Psychiatric Medical History: Reports: Hx Anxiety, Hx Depression, Hx Post 

Traumatic Stress Disorder


Past Surgical History: Reports: Hx Appendectomy, Hx Oral Surgery





- Immunizations


Hx Diphtheria, Pertussis, Tetanus Vaccination: Yes





Physical Exam





- Vital signs


Vitals: 





 











Temp Pulse Resp BP Pulse Ox


 


 98.7 F   83   14   101/60   97 


 


 10/13/18 11:10  10/13/18 11:10  10/13/18 11:10  10/13/18 11:10  10/13/18 11:10














Course





- Vital Signs


Vital signs: 





 











Temp Pulse Resp BP Pulse Ox


 


 98.7 F   83   14   101/60   97 


 


 10/13/18 11:10  10/13/18 11:10  10/13/18 11:10  10/13/18 11:10  10/13/18 11:10

## 2020-10-06 NOTE — ER DOCUMENT REPORT
ED Medical Screen (RME)





- General


Chief Complaint: Abdominal Pain


Stated Complaint: ABDOMINAL PAIN


Time Seen by Provider: 10/06/20 18:18


TRAVEL OUTSIDE OF THE [place] IN LAST 30 DAYS: No





- HPI


Notes: 





10/06/20 18:26


40 yr old female presents emergency room with right upper quadrant abdominal 

"burning pain" that started yesterday that has become progressively worse.  

Patient states worse with eating, worse with sitting up, nothing really makes 

better.  She recently tried this past month with a keto intermittent diet, 

recently moved from [place] 3 weeks ago to this area.  When she was in [place] her

doctor did do an abdominal exam and she suspected that she might have a hernia 

in her right upper quadrant, she is never had this checked out.  Last bowel 

movement was yesterday, no melena.  Patient reports she has had a 

cholecystectomy and an appendectomy done in the past.  Patient sees hematology 

for anemia and she is supposed be taking a baby aspirin 81 mg daily because she 

has history of DVT and [place] syndrome with rheumatic fever








I have greeted and performed a rapid initial assessment of this patient.  A 

comprehensive ED assessment and evaluation of the patient, analysis of test 

results and completion of the medical decision making process will be conducted 

by additional ED providers.





PHYSICAL EXAMINATION:





GENERAL: Well-appearing, well-nourished and in mid distress





HEAD: Atraumatic, normocephalic.





EYES: Pupils equal round extraocular movements intact,  conjunctiva are normal.





CV: s1, s2 regular 





LUNGS: No respiratory distress





abd: RUQ abd tenderness, LUQ abd tenderness, no cva tenderness appreciated 

bilaterally 





Musculoskeletal: Normal range of motion





NEUROLOGICAL:  Normal speech, normal gait. 





SKIN: Warm, Dry, normal turgor, no rashes or lesions noted.


10/06/20 18:27








- Related Data


Allergies/Adverse Reactions: 


                                        





iodine Allergy (Verified 10/13/18 11:06)


   


metoclopramide [From Reglan] Allergy (Verified 10/06/20 18:23)


   


seafood Allergy (Uncoded 10/13/18 11:06)


   











Past Medical History





- Social History


Chew tobacco use (# tins/day): No





- Past Medical History


Cardiac Medical History: Reports: Hx DVT


Renal/ Medical History: Denies: Hx Peritoneal Dialysis


GI Medical History: Reports: Hx Gastritis, Hx Gastroesophageal Reflux Disease, 

Hx Irritable Bowel


Musculoskeltal Medical History: Reports Hx Fibromyalgia, Reports Hx 

Musculoskeletal Trauma


Psychiatric Medical History: Reports: Hx Anxiety, Hx Depression, Hx Post 

Traumatic Stress Disorder


Past Surgical History: Reports: Hx Appendectomy, Hx  Section - x2, Hx 

Cholecystectomy - 2018, Hx Oral Surgery





- Immunizations


Hx Diphtheria, Pertussis, Tetanus Vaccination: Yes





Physical Exam





- Vital signs


Vitals: 





                                        











Temp Pulse Resp BP Pulse Ox


 


 98.2 F   84   16   117/63   100 


 


 10/06/20 17:29  10/06/20 17:29  10/06/20 17:29  10/06/20 17:29  10/06/20 17:29














Course





- Vital Signs


Vital signs: 





                                        











Temp Pulse Resp BP Pulse Ox


 


 98.2 F   84   16   117/63   100 


 


 10/06/20 18:19  10/06/20 17:29  10/06/20 17:29  10/06/20 17:29  10/06/20 17:29

## 2020-11-14 NOTE — RADIOLOGY REPORT (SQ)
EXAM DESCRIPTION:  CHEST 2 VIEWS



IMAGES COMPLETED DATE/TIME:  11/14/2020 10:29 am



REASON FOR STUDY:  Pain tightness history of rheumatic fever and mitr



COMPARISON:  None.



EXAM PARAMETERS:  NUMBER OF VIEWS: two views

TECHNIQUE: Digital Frontal and Lateral radiographic views of the chest acquired.

RADIATION DOSE: NA

LIMITATIONS: none



FINDINGS:  LUNGS AND PLEURA: No opacities, masses or pneumothorax. No pleural effusion.

MEDIASTINUM AND HILAR STRUCTURES: No masses or contour abnormalities.

HEART AND VASCULAR STRUCTURES: Heart normal size.  No evidence for failure.

BONES: No acute findings.

HARDWARE: None in the chest.

OTHER: No other significant finding.



IMPRESSION:  NO ACUTE RADIOGRAPHIC FINDING IN THE CHEST.



TECHNICAL DOCUMENTATION:  JOB ID:  [id number]

 2011 [employer]- All Rights Reserved



[place] location - IP/workstation name: [id number]

## 2020-11-14 NOTE — ER DOCUMENT REPORT
ED General





- General


Chief Complaint: Chest Pain


Stated Complaint: CHEST PAIN


Time Seen by Provider: 20 10:16


Primary Care Provider: 


RAMIRO WEATHERS MD [ACTIVE STAFF] - Follow up in 3-5 days


RAVI JACKSON MD [COMMUNITY BASED STAFF] - Follow up in 3-5 days


Mode of Arrival: Ambulatory


TRAVEL OUTSIDE OF THE [place] IN LAST 30 DAYS: No





- HPI


Notes: 





40-year-old female with a history of Biju Pablo in , fibromyalgia in 

, rheumatic fever in , MVP GERD and DVT presents to the emergency room 

by private vehicle for substernal chest tightness that started at 5 AM, has been

constant without any radiation of pain.   Patient states that she has a tight 

feeling in her chest.  Patient states she has a bilateral dull headache, denies 

worst headache of life.  Patient states she recently moved to [place] in 

2020, she is also in school for ENT.  She does smoke, she recently 

started using a drill over the last week and a half.  Patient does not take any 

medication for her GERD.  Denies any overt Covid exposure that she is aware of. 

Last menstrual cycle was 11/10/2020.  Denies any vaginal bleeding vaginal 

discharge, lower back pain.  Patient denies any cardiac surgery or history.  

Denies fevers, chills, ,palpitations,  shortness of breath, dyspnea, nausea, 

vomiting, diarrhea, abdominal pain, hematuria,blurred vision, double vision, 

loss of vision, speech changes, LH, dizziness, syncope, wheezing, ST, URI, neck 

pain, weakness, bowel or bladder dysfunction, saddle anesthesia, numbness or 

tingling in bilateral upper or lower extremities equally, muscle paralysis, 

weakness in bilateral upper or lower extremities equally or rash





- Related Data


Allergies/Adverse Reactions: 


                                        





iodine Allergy (Verified 10/13/18 11:06)


   


metoclopramide [From Reglan] Allergy (Verified 10/06/20 18:23)


   


seafood Allergy (Uncoded 10/13/18 11:06)


   








Home Medications: federmine





Past Medical History





- General


Information source: Patient





- Social History


Smoking Status: Never Smoker


Frequency of alcohol use: Occasional


Family History: Reviewed & Not Pertinent, Arthritis, CAD, CVA, DM, 

Hyperlipidemia, Hypertension, Malignancy





- Past Medical History


Cardiac Medical History: Reports: Hx DVT


Renal/ Medical History: Denies: Hx Peritoneal Dialysis


GI Medical History: Reports: Hx Gastritis, Hx Gastroesophageal Reflux Disease, 

Hx Irritable Bowel


Musculoskeletal Medical History: Reports Hx Fibromyalgia, Reports Hx 

Musculoskeletal Trauma


Psychiatric Medical History: Reports: Hx Anxiety, Hx Depression, Hx Post 

Traumatic Stress Disorder


Past Surgical History: Reports: Hx Appendectomy, Hx  Section - x2, Hx 

Cholecystectomy - 2018, Hx Oral Surgery





- Immunizations


Hx Diphtheria, Pertussis, Tetanus Vaccination: Yes





Review of Systems





- Review of Systems


Constitutional: No symptoms reported


EENT: No symptoms reported


Cardiovascular: See HPI


Respiratory: No symptoms reported


Gastrointestinal: No symptoms reported


Genitourinary: No symptoms reported


Female Genitourinary: No symptoms reported


Musculoskeletal: No symptoms reported


Skin: No symptoms reported


Hematologic/Lymphatic: No symptoms reported


Neurological/Psychological: No symptoms reported





Physical Exam





- Vital signs


Vitals: 


                                        











Temp Pulse Resp BP Pulse Ox


 


 98.3 F   79   16   124/82   99 


 


 20 10:10  20 10:10  20 10:10  20 10:10  20 10:10














- Notes


Notes: 








MEDICATIONS: I agree with the patient medications as charted by the RN.





ALLERGIES: I agree with the allergies as charted by the RN.





PAST MEDICAL HISTORY/PAST SURGICAL HISTORY: Reviewed and agree as charted by RN.





SOCIAL HISTORY: Reviewed and agree as charted by RN.





FAMILY HISTORY: No significant familial comorbid conditions directly related to 

patient complaint





EXAM:


Reviewed vital signs as charted by RN.





PHYSICAL EXAMINATION: reviewed vital signs by RN





GENERAL: Well-appearing, well-nourished and in no acute distress.





HEAD: Atraumatic, normocephalic.





EYES: Pupils equal round and reactive to light, extraocular movements intact, 

conjunctiva are normal.





ENT: Nares patent, oropharynx clear without exudates.  Moist mucous membranes.





NECK: Normal range of motion, supple without lymphadenopathy





LUNGS: Breath sounds clear to auscultation bilaterally and equal.  No wheezes 

rales or rhonchi.





HEART: Regular rate and rhythm without murmurs





ABDOMEN: Soft, nontender, nondistended abdomen.  No guarding, no rebound.  No 

masses appreciated.





Female : deferred





Musculoskeletal: Normal range of motion, no pitting or edema.  No cyanosis.





NEUROLOGICAL: Cranial nerves grossly intact.  Normal speech, normal gait.  

Normal sensory, motor exams.  PERRLA, EOMI. Full motor and sensory function 

throughout.  + 2 equal bilaterally in BUE. Tongue midline. No pronator 

drift. No ataxia. Neck with APROM. Raises eyebrows. Strength is 5 out of 5 in 

bilateral upper and lower extremities equally.Speaks in full sentences. No 

weakness on one side. Romberg gait steady able to walk straight line. 





PSYCH: Normal mood, normal affect.





SKIN: Warm, Dry, normal turgor, no rashes or lesions noted.





Course





- Re-evaluation


Re-evalutation: 





20 14:38


Afebrile vital stable no distress.  Nurses notes negative.  Chest x-ray 

unremarkable.  CBC negative for leukocytosis or anemia, CMP negative for hepatic

or renal dysfunction, no electrolyte disturbances, troponins x2 negative.  EKG 

negative for STEMI, no ST segment elevations.  HEART Score is 0.9 to 1.7% for 

acute myocardial infarction.  Patient found some relief with GI cocktail.  She 

states she did have a headache, IV fluids were given as well as Benadryl and 

Zofran.  Patient denies worst headache of life.  patient admitted that she is 

under extreme amount of stress right now with recent move from [place] to 

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 

area.  On reevaluation patient states she is feeling much better. advised to 

follow-up with cardiology as well as primary care provider within the next 24 to

48 hours.  After performing a Medical Screening Examination, I estimate there is

LOW risk for RUPTURED ESOPHAGUS, PNEUMOTHORAX, PULMONARY EMBOLISM, ACUTE 

CORONARY SYNDROME, OR THORACIC AORTIC DISSECTION, thus I consider the discharge 

disposition reasonable.  I have reevaluated this patient multiple times and no 

significant life threatening changes are noted. The patient and I have discussed

the diagnosis and risks, and we agree with discharging home with close follow-

up. We also discussed returning to the Emergency Department immediately if new o

r worsening symptoms occur. We have discussed the symptoms which are most 

concerning (e.g., bloody sputum, worsening pain or shortness of breath) that 

necessitate immediate return.


20 15:45








- Vital Signs


Vital signs: 


                                        











Temp Pulse Resp BP Pulse Ox


 


 98.0 F   79   17   135/75 H  100 


 


 20 15:01  20 10:10  20 15:01  20 15:01  20 15:01














- Laboratory


Result Diagrams: 


                                 20 10:22





                                 20 10:22


Laboratory results interpreted by me: 


                                        











  20





  10:22


 


RDW  17.3 H














- EKG Interpretation by Me


EKG shows normal: Sinus rhythm


Rate: Normal


Rhythm: NSR


Additional EKG results interpreted by me: 





20 14:45


EKG HR 75, T axis 52, QRS -9, T -22. no st segment elevation, no STEMI. 

interpreted by er supervising. 





Discharge





- Discharge


Clinical Impression: 


Chest pain


Qualifiers:


 Chest pain type: unspecified Qualified Code(s): R07.9 - Chest pain, unspecified





Condition: Stable


Disposition: HOME, SELF-CARE


Instructions:  Chest Pain of Unclear Cause ([facility]), Prilosec (Acid Pump Inhibitor)

([facility])


Additional Instructions: 


All of your lab work was normal, your EKG as well as your cardiac enzymes were 

normal today.  You were given some IV fluids, Toradol, and a GI cocktail without

Reglan.  It is advised that you follow-up with a cardiac specialist as well as a

primary care provider within the next 3 to 5 days or sooner if needed.  If you 

experience any worsening symptoms such as shortness of breath, chest pain, 

vomiting please return to the emergency room.  Return immediately for any new or

worsening symptoms.





Follow up with primary care provider, call tomorrow to make followup 

appointment.


Prescriptions: 


Omeprazole 20 mg PO BID #40 capsule.


Referrals: 


RAMIRO WEATHERS MD [ACTIVE STAFF] - Follow up in 3-5 days


RAVI JACKSON MD [COMMUNITY BASED STAFF] - Follow up in 3-5 days

## 2020-11-14 NOTE — ER DOCUMENT REPORT
ED Medical Screen (RME)





- General


Chief Complaint: Chest Pain


Stated Complaint: CHEST PAIN


Time Seen by Provider: 20 10:16


Mode of Arrival: Ambulatory


Information source: Patient


Notes: 





40-year-old female presented ED for chest pain with tight feeling hot and cold 

flashes but no fever headache.  She states she does have 3 of rheumatic fever 

with mitral valve prolapse.  She does not smoke she occasionally drinks and does

not use any illicit drugs.  She is alert oriented respirations regular 

nonlabored speaking in full sentences.

















I have greeted and performed a rapid initial assessment of this patient.  A 

comprehensive ED assessment and evaluation of the patient, analysis of test 

results and completion of medical decision making process will be conducted by 

an additional ED providers.


TRAVEL OUTSIDE OF THE [place] IN LAST 30 DAYS: No





- Related Data


Allergies/Adverse Reactions: 


                                        





iodine Allergy (Verified 10/13/18 11:06)


   


metoclopramide [From Reglan] Allergy (Verified 10/06/20 18:23)


   


seafood Allergy (Uncoded 10/13/18 11:06)


   








Home Medications: federmine





Past Medical History





- Social History


Frequency of alcohol use: Occasional





- Past Medical History


Cardiac Medical History: Reports: Hx DVT


Renal/ Medical History: Denies: Hx Peritoneal Dialysis


GI Medical History: Reports: Hx Gastritis, Hx Gastroesophageal Reflux Disease, 

Hx Irritable Bowel


Musculoskeltal Medical History: Reports Hx Fibromyalgia, Reports Hx 

Musculoskeletal Trauma


Psychiatric Medical History: Reports: Hx Anxiety, Hx Depression, Hx Post 

Traumatic Stress Disorder


Past Surgical History: Reports: Hx Appendectomy, Hx  Section - x2, Hx 

Cholecystectomy - 2018, Hx Oral Surgery





- Immunizations


Hx Diphtheria, Pertussis, Tetanus Vaccination: Yes





Physical Exam





- Vital signs


Vitals: 





                                        











Temp Pulse Resp BP Pulse Ox


 


 98.3 F   79   16   124/82   99 


 


 20 10:10  20 10:10  20 10:10  20 10:10  20 10:10














Course





- Vital Signs


Vital signs: 





                                        











Temp Pulse Resp BP Pulse Ox


 


 98.3 F   79   16   124/82   99 


 


 20 10:10  20 10:10  20 10:10  20 10:10  20 10:10

## 2020-12-10 NOTE — ER DOCUMENT REPORT
ED GI/





- General


Chief Complaint: Flank Pain


Stated Complaint: FLANK PAIN


Time Seen by Provider: 12/10/20 09:28


Primary Care Provider: 


[facility] [Provider Group] - Follow up as needed


TRAVEL OUTSIDE OF THE [place] IN LAST 30 DAYS: No





- HPI


Notes: 





Patient is a 41 y/o female with a hx of pyelonephritis who presents with left 

flank pain that began yesterday.  Patient states her pain is worsening and now 

radiating to her left lower quadrant.  She reports nausea, vomiting, diarrhea 

but denies fever, dysuria, hematuria, chest pain, and shortness of breath.  She 

states she had pyelonephritis a year ago and that her pain today is very 

similar.  She states she took a dose of Zofran last night which helped her 

nausea.  Patient has a history of cholecystectomy, appendectomy, fibromyalgia, 

RA, and Guillain-Pablo.  Patient denies tobacco use.














- Related Data


Allergies/Adverse Reactions: 


                                        





iodine Allergy (Verified 12/10/20 12:35)


   


metoclopramide [From Reglan] Allergy (Verified 12/10/20 12:35)


   


seafood Allergy (Uncoded 12/10/20 12:35)


   








Home Medications: phetermine, biotin, vit d





Past Medical History





- General


Information source: Patient





- Social History


Smoking Status: Former Smoker


Frequency of alcohol use: Rare


Drug Abuse: None


Family History: Reviewed & Not Pertinent, Arthritis, CAD, CVA, DM, Hyperlipidemi

a, Hypertension, Malignancy


Patient has homicidal ideation: No





- Past Medical History


Cardiac Medical History: Reports: Hx DVT


Renal/ Medical History: Denies: Hx Peritoneal Dialysis


GI Medical History: Reports: Hx Gastritis, Hx Gastroesophageal Reflux Disease, 

Hx Irritable Bowel


Musculoskeletal Medical History: Reports Hx Fibromyalgia, Reports Hx Musculoske

letal Trauma


Psychiatric Medical History: Reports: Hx Anxiety, Hx Depression, Hx Post 

Traumatic Stress Disorder


Past Surgical History: Reports: Hx Appendectomy, Hx  Section - x2, Hx 

Cholecystectomy - 2018, Hx Oral Surgery





- Immunizations


Hx Diphtheria, Pertussis, Tetanus Vaccination: Yes





Review of Systems





- Review of Systems


Constitutional: No symptoms reported


EENT: No symptoms reported


Cardiovascular: No symptoms reported


Respiratory: No symptoms reported


Gastrointestinal: See HPI


Genitourinary: See HPI


Female Genitourinary: No symptoms reported


Musculoskeletal: No symptoms reported


Skin: No symptoms reported


Hematologic/Lymphatic: No symptoms reported


Neurological/Psychological: No symptoms reported





Physical Exam





- Vital signs


Vitals: 


                                        











Temp Pulse Resp BP Pulse Ox


 


 98.1 F   86   16   117/77   98 


 


 12/10/20 08:59  12/10/20 08:59  12/10/20 08:59  12/10/20 08:59  12/10/20 08:59














- Notes


Notes: 





PHYSICAL EXAMINATION:





VITALS: Vitals reviewed and within normal limits.


 


GENERAL: Well-appearing, well-nourished and in no acute distress.


 


HEAD: Atraumatic, normocephalic.


 


EYES: Pupils equal, round, and reactive to light, extraocular movements intact, 

sclera anicteric, conjunctiva are normal.


 


ENT: Nares patent. Moist mucous membranes. Oropharynx clear without exudates. 


 


NECK: Normal range of motion, supple without lymphadenopathy.


 


LUNGS: Breath sounds clear to auscultation bilaterally and equal.  No wheezes, 

rales, or rhonchi.


 


HEART: Regular, rate, and rhythm without murmurs.


 


ABDOMEN: Soft, nontender, normoactive bowel sounds.  Left CVA tenderness. No 

guarding, no rebound.  No masses appreciated. 


 


EXTREMITIES: Normal range of motion, no pitting or edema.  No cyanosis.


 


NEUROLOGICAL: No focal neurological deficits. Moves all extremities 

spontaneously and on command.


 


PSYCH: Normal mood, normal affect.


 


SKIN: Warm, Dry, normal turgor, no rashes or lesions noted.





Course





- Re-evaluation


Re-evalutation: 





Patient is a 41 y/o female with a hx of pyelonephritis who presents with left 

flank pain that began yesterday.  Vital signs are within normal limits. On exam,

left CVA tenderness, abdominal exam is benign without any focal tenderness. CBC 

and CMP are unremarkable and within normal limits. UA shows moderate blood. I 

consulted my supervising physician, Dr. Julien, concerning this patient and he 

recommends ordering CT abd/pelvis without contrast to rule out kidney stone. CT 

abd/pelvis is negative for any significant or acute process in the abdomen and 

pelvis. Dr. Julien informed of CT results and he recommends having the patient 

follow up with her primary care provider concerning her symptoms. 





Laboratories are unremarkable without evidence of cystitis, pregnancy, or 

leukocytosis.  Patient is overall very well in appearance.  Based on clinical 

history and examination I do not suspect an acute appendicitis, tubo-ovarian 

abscess, pregnancy related pathology, pelvic inflammatory disease, mesenteric 

ischemia, or pyelonephritis.  Will discharge home with return precautions and 

followup recommendations. Prescription given for 800mg ibuprofen per patient's 

request.  She understands and is in agreement with the plan.











- Vital Signs


Vital signs: 


                                        











Temp Pulse Resp BP Pulse Ox


 


 98.1 F   86   14   118/71   100 


 


 12/10/20 08:59  12/10/20 08:59  12/10/20 12:45  12/10/20 12:45  12/10/20 12:45














- Laboratory Results


Result Diagrams: 


                                 12/10/20 09:40





                                 12/10/20 09:40


Laboratory Results Interpreted: 


                                        











  12/10/20 12/10/20





  09:40 09:40


 


RDW  16.8 H 


 


Seg Neutrophils %  79.3 H 


 


Urine Ketones   TRACE H


 


Urine Blood   MODERATE H


 


Ur Leukocyte Esterase   TRACE H











Critical Laboratory Results Reviewed: No Critical Results





- Radiology Results


Critical Radiology Results Reviewed: No Critical Results





Discharge





- Discharge


Clinical Impression: 


 Flank pain, Nausea





Abdominal pain


Qualifiers:


 Abdominal location: left lower quadrant Qualified Code(s): R10.32 - Left lower 

quadrant pain





Condition: Stable


Disposition: HOME, SELF-CARE


Instructions:  Abdominal Pain ([facility])


Prescriptions: 


Ondansetron [Zofran Odt 4 mg Tablet] 1 - 2 tab PO Q4HP PRN #15 tab.rapdis


 PRN Reason: 


Ibuprofen [Motrin 800 mg Tablet] 800 mg PO Q8H PRN #30 tab


 PRN Reason: 


Referrals: 


[facility] [Provider Group] - Follow up as needed

## 2020-12-10 NOTE — RADIOLOGY REPORT (SQ)
EXAM DESCRIPTION:  CT ABD/PELVIS NO ORAL OR IV



IMAGES COMPLETED DATE/TIME:  12/10/2020 10:45 am



REASON FOR STUDY:  left flank pain



COMPARISON:  None.



TECHNIQUE:  CT scan of the abdomen and pelvis performed without intravenous or oral contrast. Images 
reviewed with lung, soft tissue, and bone windows. Reconstructed coronal and sagittal MPR images revi
ewed. All images stored on PACS.

All CT scanners at this facility use dose modulation, iterative reconstruction, and/or weight based d
osing when appropriate to reduce radiation dose to as low as reasonably achievable (ALARA).

[facility]: Dose Right  [facility]: CareDose    [facility]: Dose Right    [facility]: Teradose 4D    [facility]: Smart Technologies



RADIATION DOSE:  CT Rad equipment meets quality standard of care and radiation dose reduction techniq
ues were employed. CTDIvol: 8.4 mGy. DLP: 464 mGy-cm.mGy.



LIMITATIONS:  None.



FINDINGS:  LOWER CHEST: No significant findings. No nodules or infiltrates.

NON-CONTRASTED LIVER, SPLEEN, ADRENALS: Evaluation limited by lack of IV contrast. No identified sign
ificant masses.

PANCREAS: No masses. No peripancreatic inflammatory changes.

GALLBLADDER: Surgically absent.

RIGHT KIDNEY AND URETER: No suspicious masses. Assessment limited by lack of IV contrast.   No signif
icant calcifications.   No hydronephrosis or hydroureter.

LEFT KIDNEY AND URETER: No suspicious masses. Assessment limited by lack of IV contrast.   No signifi
cant calcifications.   No hydronephrosis or hydroureter.

AORTA AND RETROPERITONEUM: No aneurysm. No retroperitoneal masses or adenopathy.

BOWEL AND PERITONEAL CAVITY: No obvious masses or inflammatory changes. No free fluid.

APPENDIX: Normal.

PELVIS, BLADDER, AND ABDOMINAL WALL:No abnormal masses. No free fluid. Bladder normal.

BONES: No significant findings.

OTHER: No other significant finding.



IMPRESSION:  NO SIGNIFICANT OR ACUTE PROCESS IN THE ABDOMEN OR PELVIS.



COMMENT:  Quality ID # [id number]: Final reports with documentation of one or more dose reduction techniques
 (e.g., Automated exposure control, adjustment of the mA and/or kV according to patient size, use of 
iterative reconstruction technique)



TECHNICAL DOCUMENTATION:  JOB ID:  [id number]

 2011 [employer]- All Rights Reserved



Reading location - IP/workstation name: [email or web address]

## 2021-01-06 NOTE — RADIOLOGY REPORT (SQ)
EXAM DESCRIPTION:  HIP LEFT AP/LATERAL



IMAGES COMPLETED DATE/TIME:  1/6/2021 11:27 am



REASON FOR STUDY:  trauma



COMPARISON:  None.



NUMBER OF VIEWS:  Two views.



TECHNIQUE:  AP pelvis and additional frog legview of the left hip.



LIMITATIONS:  None.



FINDINGS:  MINERALIZATION: Normal.

LEFT HIP: No fracture or dislocation.  No worrisome bone lesions.

RIGHT HIP: No fracture or dislocation.  No worrisome bone lesions. Limited views.

PUBIS AND ISCHIUM: No fracture.

PELVIS: No fracture.

SACRUM: No fracture or dislocation. No worrisome bone lesions.

LOWER LUMBAR SPINE: No fracture or dislocation. No worrisome bone lesions.  No significant disc disea
se.

SOFT TISSUES: No findings.

OTHER: No other significant finding.



IMPRESSION:  NEGATIVE STUDY OF THE LEFT HIP AND PELVIS. NO RADIOGRAPHIC EVIDENCE OF ACUTE INJURY.



TECHNICAL DOCUMENTATION:  JOB ID:  [id number]

 2011 [employer]- All Rights Reserved



Reading location - IP/workstation name: [email or web address]

## 2021-01-06 NOTE — RADIOLOGY REPORT (SQ)
EXAM DESCRIPTION:  L SPINE WHOLE



IMAGES COMPLETED DATE/TIME:  1/6/2021 11:27 am



REASON FOR STUDY:  trauma



COMPARISON:  None.



NUMBER OF VIEWS:  Five views including obliques.



TECHNIQUE:  AP, lateral, oblique, and sacral radiographic images acquired of the lumbar spine.



LIMITATIONS:  None.



FINDINGS:  MINERALIZATION: Normal.

SEGMENTATION: Normal.  No transitional anatomy.

ALIGNMENT: Normal.

VERTEBRAE: Maintained height.  No fracture or worrisome bone lesion.

DISCS: Preserved height.  No significant osteophytes or end plate irregularity.

POSTERIOR ELEMENTS: Pedicles and facets are intact.  No pars defect or posterior arch defects.

HARDWARE: None in the spine.

PARASPINAL SOFT TISSUES: Normal.

PELVIS: Intact as visualized. No fractures or worrisome bone lesions. SI joints intact.

OTHER: No other significant finding.



IMPRESSION:  No radiographic abnormality of the lumbar spine.



TECHNICAL DOCUMENTATION:  JOB ID:  [id number]

 2011 [employer]- All Rights Reserved



Reading location - IP/workstation name: [email or web address]

## 2021-01-06 NOTE — ER DOCUMENT REPORT
ED General





- General


Chief Complaint: Hip Pain


Stated Complaint: CHEST PAIN, HIP PAIN


Time Seen by Provider: 21 11:21


TRAVEL OUTSIDE OF THE [place] IN LAST 30 DAYS: No





- HPI


Notes: 





Chief complaint: Left hip and lower back pain





History of present illness: 40-year-old female presents for evaluation of left 

hip and lower back pain which developed after she fell and struck her left hip 

against the arm of a couch at home while playing with one of her children about 

3 days ago.  She was initially able to bear weight but says she can no longer 

bear weight now and the pain is becoming much worse.  She denies any visible 

swelling.  States that she is not on any regular medications.  She is allergic 

to metoclopramide.





- Related Data


Allergies/Adverse Reactions: 


                                        





iodine Allergy (Verified 12/10/20 12:35)


   


metoclopramide [From Reglan] Allergy (Verified 12/10/20 12:35)


   


seafood Allergy (Uncoded 12/10/20 12:35)


   











Past Medical History





- General


Information source: Patient





- Social History


Smoking Status: Never Smoker


Frequency of alcohol use: Rare


Drug Abuse: None


Lives with: Family


Family History: Reviewed & Not Pertinent, Arthritis, CAD, CVA, DM, 

Hyperlipidemia, Hypertension, Malignancy


Patient has homicidal ideation: No





- Past Medical History


Cardiac Medical History: Reports: Hx DVT


Renal/ Medical History: Denies: Hx Peritoneal Dialysis


GI Medical History: Reports: Hx Gastritis, Hx Gastroesophageal Reflux Disease, 

Hx Irritable Bowel


Musculoskeletal Medical History: Reports Hx Fibromyalgia, Reports Hx 

Musculoskeletal Trauma


Psychiatric Medical History: Reports: Hx Anxiety, Hx Depression, Hx Post 

Traumatic Stress Disorder


Past Surgical History: Reports: Hx Appendectomy, Hx  Section - x2, Hx 

Cholecystectomy - 2018, Hx Oral Surgery





- Immunizations


Hx Diphtheria, Pertussis, Tetanus Vaccination: Yes





Review of Systems





- Review of Systems


Notes: 





Constitutional: Negative for fever.


HENT: Negative for sore throat.


Eyes: Negative for visual changes.


Cardiovascular: Negative for chest pain.


Respiratory: Negative for shortness of breath.


Gastrointestinal: Negative for abdominal pain, vomiting or diarrhea.


Genitourinary: Negative for dysuria.


Musculoskeletal: As per HPI.


Skin: Negative for rash.


Neurological: Negative for headaches, weakness or numbness.





10 point ROS negative except as marked above and in HPI.








Physical Exam





- Vital signs


Vitals: 


                                        











Pulse Resp BP Pulse Ox


 


 90   16   114/68   100 


 


 21 08:55  21 08:55  21 08:55  21 08:55














- Notes


Notes: 











GENERAL: Well-developed well-nourished female approximately stated age appearing

in moderate pain.





SKIN: Good turgor no rashes.





HEAD: Normocephalic atraumatic.





EYES: PERRLA.  EOMI.  Conjunctivae and sclerae clear.





EARS: CANALS AND TMS CLEAR.





NOSE: CLEAR.





MOUTH: Moist mucosa.  Good dentition.  No stridor or edema.  No drooling.





NECK: Supple.  No masses or thyromegaly.  No adenopathy.  Carotids 2+ without 

bruits.  No JVD.





BACK: Symmetrical without tenderness.





CHEST: Respirations unlabored.  Breath sounds clear and symmetrical.





HEART: Regular rhythm.  No murmur gallop or rub.





ABDOMEN: Soft nontender without masses, organomegaly or rebound.  Bowel sounds 

normally active.  No bruits.





GENITALIA: Deferred.





EXTREMITIES: There is no abnormal shortening or rotation of the left lower ext

remity.  She is moderately tender over the posterior lateral aspect of the left 

hip.  She complains of severe pain with any attempts of active or passive 

movement of the left hip.  She is also tender in her left flank area.  No 

visible ecchymoses.  She is unable to bear weight due to pain in her left hip 

and left flank area.  No edema.  No calf tenderness.  Cap refill less than 1.5 

seconds.  Dorsalis pedis and posterior tibial pulses 3+ and symmetrical.





NEUROLOGICAL: GCS 15.  Alert and oriented x3.  Fluent speech.  Cranial nerves II

through XII intact.  Sensorimotor and cerebellar normal.  Normal tone.





PSYCHIATRIC: Appropriate affect.





Course





- Re-evaluation


Re-evalutation: 





21 16:35


Plain films of the lumbar spine and left hip negative for fracture or 

dislocation per radiologist.  IV Toradol provided little relief of pain.  She 

subsequently got fentanyl IV.  We note that the patient has a past history of 

DVT.  I got a Doppler ultrasound left lower extremity and there was no evidence 

of DVT per radiologist.  I think she probably has a sciatica.  She subsequently 

got some oral Percocet.  I also gave her a dose of IV Decadron.





Findings, clinical impression and plan of treatment have been discussed with 

patient/family.  Understanding of current findings and recommendations has been 

acknowledged by them and there is agreement regarding disposition and follow-up.





- Vital Signs


Vital signs: 


                                        











Temp Pulse Resp BP Pulse Ox


 


 98.2 F   90   16   116/79   99 


 


 21 10:10  21 08:55  21 16:00  21 14:01  21 16:00














- Laboratory Results


Result Diagrams: 


                                 21 10:15





                                 21 10:15


Laboratory Results Interpreted: 


                                        











  21





  10:15 14:48


 


Hct  34.7 L 


 


RDW  15.5 H 


 


Urine Blood   SMALL H











Critical Laboratory Results Reviewed: No Critical Results





- Radiology Results


Radiology Results Interpreted: 





21 16:35





                                        





Hip X-Ray  21 11:27


IMPRESSION:  NEGATIVE STUDY OF THE LEFT HIP AND PELVIS. NO RADIOGRAPHIC EVIDENCE

OF ACUTE INJURY.


 








Lumbar Spine X-Ray  21 11:28


IMPRESSION:  No radiographic abnormality of the lumbar spine.


 








Venous Doppler Study  21 13:08


IMPRESSION:  NO EVIDENCE OF DVT OR SVT IN THE LEFT LEG.


 











Critical Radiology Results Reviewed: No Critical Results





Discharge





- Discharge


Clinical Impression: 


Sciatica


Qualifiers:


 Laterality: left Qualified Code(s): M54.32 - Sciatica, left side





Condition: Stable


Disposition: HOME, SELF-CARE


Additional Instructions: 


Sciatica





     Your symptoms suggest "sciatica."  The pain of sciatica typically radiates 

down the leg.  Numbness in the foot or calf may also occur. Sciatica is caused 

by irritation of the sciatic nerve or its branches. The irritation can be due to

a herniated disk in the spine, swelling and inflammation in the muscles 

surrounding the sciatic nerve, or direct injury of the nerve itself.


     Most cases of sciatica will resolve with medical treatment. Bed rest is usu

ally recommended initially.  Surgery is only necessary when the condition will 

not improve with rest and antiinflammatory medication.  Muscle relaxers are 

often given if muscle soreness is present.


     A CAT scan of the back may be performed if a herniated disk is suspected.


     Re-examination is necessary if you develop increasing numbness, localized 

weakness in the foot or ankle, or if the pain does not respond to rest.





Return here as needed for new or worsening symptoms:





Pain that is worsening or unimproved


Uncontrolled vomiting


High fever or shaking chills


Overall worsening





Take prescribed medications as directed.





Follow-up with your primary care physician next 3 to 5 days.


Prescriptions: 


Prednisone [Deltasone 20 mg Tablet] 2 tab PO DAILY 5 Days  tablet


Oxycodone HCl/Acetaminophen [Percocet 5-325 mg Tablet] 1 tab PO Q6H PRN #15 

tablet


 PRN Reason:

## 2021-01-06 NOTE — RADIOLOGY REPORT (SQ)
EXAM DESCRIPTION:  VENOUS UNILATERAL LOWER



IMAGES COMPLETED DATE/TIME:  1/6/2021 3:38 pm



REASON FOR STUDY:  LLE pain; hx. DVT



COMPARISON:  None.



TECHNIQUE:  Dynamic and static gray scale and color images acquired of the left leg venous system. Se
lected spectral images acquired with additional compression and augmentation maneuvers. The contralat
eral common femoral vein and saphenofemoral junction were also imaged. Images stored on PACS.



LIMITATIONS:  None.



FINDINGS:  COMMON FEMORAL: Normal phasicity, compression and augmentation. No visualized echogenic ma
terial on gray scale. No defects on color images.

FEMORAL: Normal compression and augmentation. No visualized echogenic material on gray scale. No defe
cts on color images.

POPLITEAL: Normal compression, augmentation. No visualized echogenic material on gray scale. No defec
ts on color images.

CALF VESSELS: Normal compression, augmentation. No visualized echogenic material on gray scale. No de
fects on color images.

GSV and SSV: Normal compression, augmentation. No visualized echogenic material on gray scale. No def
ects on color images.

ANY DEEP VENOUS INSUFFICIENCY: Not evaluated.

ANY EVIDENCE OF POPLITEAL CYST: No.

OTHER: No other significant finding.

CONTRALATERAL COMMON FEMORAL VEIN AND SAPHENOFEMORAL JUNCTION:

Normal phasicity, compression and augmentation. No visualized echogenic material on gray scale. No de
fects on color images.



IMPRESSION:  NO EVIDENCE OF DVT OR SVT IN THE LEFT LEG.



TECHNICAL DOCUMENTATION:  JOB ID:  [id number]

 2011 [employer]- All Rights Reserved



Reading location - IP/workstation name: [email or web address]

## 2021-01-11 NOTE — RADIOLOGY REPORT (SQ)
EXAM DESCRIPTION:  KUB/ABDOMEN (SINGLE VIEW)



IMAGES COMPLETED DATE/TIME:  1/11/2021 5:21 pm



REASON FOR STUDY:  K59.00 CONSTIPATION, UNSPECIFIED CONSTIPATION TYPE K59.00  CONSTIPATION, UNSPECIFI
ED



COMPARISON:  None.



NUMBER OF VIEWS:  One view.



TECHNIQUE:   Supine radiographic image of the abdomen acquired.



LIMITATIONS:  None.



FINDINGS:  BOWEL GAS PATTERN: Retained stool.  Nonobstructive gas pattern.

CALCIFICATIONS: No suspicious calcifications.

SOFT TISSUES: No gross mass or suggestion of organomegaly.

HARDWARE: None in the abdomen.

BONES: No acute fracture. No worrisome bone lesions.

OTHER: No other significant finding.



IMPRESSION:  Constipation.  Moderate stool burden.



TECHNICAL DOCUMENTATION:  JOB ID:  [id number]

 2011 [employer]- All Rights Reserved



Reading location - IP/workstation name: [email or web address]